# Patient Record
Sex: FEMALE | Race: BLACK OR AFRICAN AMERICAN | NOT HISPANIC OR LATINO | Employment: FULL TIME | URBAN - METROPOLITAN AREA
[De-identification: names, ages, dates, MRNs, and addresses within clinical notes are randomized per-mention and may not be internally consistent; named-entity substitution may affect disease eponyms.]

---

## 2018-05-19 ENCOUNTER — HOSPITAL ENCOUNTER (EMERGENCY)
Facility: HOSPITAL | Age: 33
Discharge: HOME | End: 2018-05-19
Payer: COMMERCIAL

## 2018-05-19 ENCOUNTER — HOSPITAL ENCOUNTER (EMERGENCY)
Facility: HOSPITAL | Age: 33
Discharge: HOME | End: 2018-05-20
Attending: EMERGENCY MEDICINE
Payer: COMMERCIAL

## 2018-05-19 VITALS
TEMPERATURE: 99 F | HEIGHT: 65 IN | OXYGEN SATURATION: 98 % | WEIGHT: 178 LBS | HEART RATE: 88 BPM | DIASTOLIC BLOOD PRESSURE: 77 MMHG | BODY MASS INDEX: 29.66 KG/M2 | RESPIRATION RATE: 20 BRPM | SYSTOLIC BLOOD PRESSURE: 119 MMHG

## 2018-05-19 DIAGNOSIS — R10.9 ABDOMINAL PRESSURE: Primary | ICD-10-CM

## 2018-05-19 DIAGNOSIS — R10.84 GENERALIZED ABDOMINAL PAIN: Primary | ICD-10-CM

## 2018-05-19 DIAGNOSIS — N76.0 BACTERIAL VAGINOSIS: ICD-10-CM

## 2018-05-19 DIAGNOSIS — B96.89 BACTERIAL VAGINOSIS: ICD-10-CM

## 2018-05-19 LAB
ALBUMIN SERPL BCP-MCNC: 3.3 G/DL
ALP SERPL-CCNC: 68 U/L
ALT SERPL W/O P-5'-P-CCNC: 18 U/L
ANION GAP SERPL CALC-SCNC: 9 MMOL/L
AST SERPL-CCNC: 16 U/L
B-HCG UR QL: NEGATIVE
B-HCG UR QL: NEGATIVE
BACTERIA GENITAL QL WET PREP: ABNORMAL
BASOPHILS # BLD AUTO: 0.02 K/UL
BASOPHILS NFR BLD: 0.3 %
BILIRUB SERPL-MCNC: 0.5 MG/DL
BILIRUB UR QL STRIP: NEGATIVE
BUN SERPL-MCNC: 14 MG/DL
CALCIUM SERPL-MCNC: 9.2 MG/DL
CHLORIDE SERPL-SCNC: 104 MMOL/L
CLARITY UR: CLEAR
CLUE CELLS VAG QL WET PREP: ABNORMAL
CO2 SERPL-SCNC: 27 MMOL/L
COLOR UR: YELLOW
CREAT SERPL-MCNC: 1 MG/DL
DIFFERENTIAL METHOD: ABNORMAL
EOSINOPHIL # BLD AUTO: 0 K/UL
EOSINOPHIL NFR BLD: 0.4 %
ERYTHROCYTE [DISTWIDTH] IN BLOOD BY AUTOMATED COUNT: 13.9 %
EST. GFR  (AFRICAN AMERICAN): >60 ML/MIN/1.73 M^2
EST. GFR  (NON AFRICAN AMERICAN): >60 ML/MIN/1.73 M^2
FILAMENT FUNGI VAG WET PREP-#/AREA: ABNORMAL
GLUCOSE SERPL-MCNC: 95 MG/DL
GLUCOSE UR QL STRIP: NEGATIVE
HCT VFR BLD AUTO: 35.2 %
HGB BLD-MCNC: 11.8 G/DL
HGB UR QL STRIP: NEGATIVE
KETONES UR QL STRIP: NEGATIVE
LEUKOCYTE ESTERASE UR QL STRIP: NEGATIVE
LIPASE SERPL-CCNC: 22 U/L
LYMPHOCYTES # BLD AUTO: 1.8 K/UL
LYMPHOCYTES NFR BLD: 23.2 %
MCH RBC QN AUTO: 27.4 PG
MCHC RBC AUTO-ENTMCNC: 33.5 G/DL
MCV RBC AUTO: 82 FL
MONOCYTES # BLD AUTO: 0.8 K/UL
MONOCYTES NFR BLD: 9.8 %
NEUTROPHILS # BLD AUTO: 5.2 K/UL
NEUTROPHILS NFR BLD: 66.3 %
NITRITE UR QL STRIP: NEGATIVE
PH UR STRIP: 7 [PH] (ref 5–8)
PLATELET # BLD AUTO: 240 K/UL
PMV BLD AUTO: 10 FL
POTASSIUM SERPL-SCNC: 3.6 MMOL/L
PROT SERPL-MCNC: 7.7 G/DL
PROT UR QL STRIP: NEGATIVE
RBC # BLD AUTO: 4.31 M/UL
SODIUM SERPL-SCNC: 140 MMOL/L
SP GR UR STRIP: 1.01 (ref 1–1.03)
SPECIMEN SOURCE: ABNORMAL
T VAGINALIS GENITAL QL WET PREP: ABNORMAL
URN SPEC COLLECT METH UR: NORMAL
UROBILINOGEN UR STRIP-ACNC: NEGATIVE EU/DL
WBC # BLD AUTO: 7.86 K/UL
WBC #/AREA VAG WET PREP: ABNORMAL
YEAST GENITAL QL WET PREP: ABNORMAL

## 2018-05-19 PROCEDURE — 99283 EMERGENCY DEPT VISIT LOW MDM: CPT | Mod: 27

## 2018-05-19 PROCEDURE — 85025 COMPLETE CBC W/AUTO DIFF WBC: CPT

## 2018-05-19 PROCEDURE — 81003 URINALYSIS AUTO W/O SCOPE: CPT | Mod: 91

## 2018-05-19 PROCEDURE — 81025 URINE PREGNANCY TEST: CPT | Mod: 91

## 2018-05-19 PROCEDURE — 83690 ASSAY OF LIPASE: CPT

## 2018-05-19 PROCEDURE — 81003 URINALYSIS AUTO W/O SCOPE: CPT

## 2018-05-19 PROCEDURE — 81025 URINE PREGNANCY TEST: CPT

## 2018-05-19 PROCEDURE — 87491 CHLMYD TRACH DNA AMP PROBE: CPT

## 2018-05-19 PROCEDURE — 87210 SMEAR WET MOUNT SALINE/INK: CPT

## 2018-05-19 PROCEDURE — 80053 COMPREHEN METABOLIC PANEL: CPT

## 2018-05-19 PROCEDURE — 99283 EMERGENCY DEPT VISIT LOW MDM: CPT

## 2018-05-19 RX ORDER — HYDROCHLOROTHIAZIDE 25 MG/1
25 TABLET ORAL DAILY
COMMUNITY
End: 2023-03-10 | Stop reason: SDUPTHER

## 2018-05-19 RX ORDER — METRONIDAZOLE 500 MG/1
500 TABLET ORAL EVERY 12 HOURS
Qty: 14 TABLET | Refills: 0 | Status: SHIPPED | OUTPATIENT
Start: 2018-05-19 | End: 2018-05-26

## 2018-05-19 RX ORDER — LEVONORGESTREL AND ETHINYL ESTRADIOL 0.1-0.02MG
1 KIT ORAL DAILY
COMMUNITY
End: 2021-01-06 | Stop reason: CLARIF

## 2018-05-19 NOTE — DISCHARGE INSTRUCTIONS
[facility]  Re-evaluation with new/worsening complaints  Urinalysis normal  Exam normal  No emergent condition detected at this time  Follow up with a PCP - provided clinic information.

## 2018-05-19 NOTE — ED PROVIDER NOTES
"SCRIBE #1 NOTE: I, Terence Cronin, am scribing for, and in the presence of, SALOME Mayberry. I have scribed the entire note.       History      Chief Complaint   Patient presents with    Urinary Urgency     pt c/o urinary urgency and bladder pain       Review of patient's allergies indicates:   Allergen Reactions    Bactrim [sulfamethoxazole-trimethoprim] Swelling        HPI   HPI    5/19/2018, 12:43 AM   History obtained from the patient      History of Present Illness: Ruma Keane is a 32 y.o. female patient who presents to the Emergency Department for increased urinary urgency which onset gradually 1 days ago. Symptoms are constant and moderate in severity.  No mitigating or exacerbating factors reported. Associated sxs include lower abd "pressure." Pt reports that [facility], had a pelvic exam and labs drawn, and is currently waiting on the results. Patient denies any dysuria, hematuria, n/v/d, changes in urinary frequency, vaginal pain/ discharge, and all other sxs at this time. No further complaints or concerns at this time.       Arrival mode: Personal vehicle    PCP: No primary care provider on file.       Past Medical History:  History reviewed. No pertinent past medical history.    Past Surgical History:  No past surgical history on file.      Family History:  History reviewed. No pertinent family history.    Social History:  Social History     Social History Main Topics    Smoking status: Not on file    Smokeless tobacco: Not on file    Alcohol use Not on file    Drug use: Unknown    Sexual activity: Not on file       ROS   Review of Systems   Constitutional: Negative for chills and fever.   Respiratory: Negative for shortness of breath.    Cardiovascular: Negative for chest pain.   Gastrointestinal: Negative for blood in stool, constipation, diarrhea, nausea and vomiting.        + abdominal "pressure"   Genitourinary: Positive for urgency. " "Negative for difficulty urinating, dysuria, frequency, hematuria, vaginal bleeding and vaginal pain.   Neurological: Negative for dizziness, weakness, light-headedness, numbness and headaches.   All other systems reviewed and are negative.      Physical Exam      Initial Vitals [05/19/18 0039]   BP Pulse Resp Temp SpO2   119/77 88 20 98.7 °F (37.1 °C) 98 %      MAP       91          Physical Exam  Nursing Notes and Vital Signs Reviewed.  Constitutional: Patient is in no apparent distress. Well-developed and well-nourished.  Head: Atraumatic. Normocephalic.  Eyes: Conjunctivae are not pale.   Neck: Supple. Full ROM. No lymphadenopathy.  Cardiovascular: Regular rate. Regular rhythm. No murmurs, rubs, or gallops. Well perfused  Pulmonary/Chest: No respiratory distress. Clear to auscultation bilaterally. No wheezing or rales.  Abdominal: Soft and non-distended.  There is no tenderness.  No rebound, guarding, or rigidity. Good bowel sound.  Musculoskeletal: Moves all extremities. No obvious deformities. Skin: Warm and dry.  Neurological:  Alert, awake, and appropriate.  Normal speech.  No acute focal neurological deficits are appreciated.  Psychiatric: Normal affect. Good eye contact. Appropriate in content.    ED Course    Procedures  ED Vital Signs:  Vitals:    05/19/18 0039   BP: 119/77   Pulse: 88   Resp: 20   Temp: 98.7 °F (37.1 °C)   TempSrc: Oral   SpO2: 98%   Weight: 80.7 kg (178 lb 0.3 oz)   Height: 5' 5" (1.651 m)       Abnormal Lab Results:  Labs Reviewed   URINALYSIS   PREGNANCY TEST, URINE RAPID   URINALYSIS        All Lab Results:  Results for orders placed or performed during the hospital encounter of 05/19/18   Urinalysis   Result Value Ref Range    Specimen UA Urine, Clean Catch     Color, UA Yellow Yellow, Straw, Iva    Appearance, UA Clear Clear    pH, UA 7.0 5.0 - 8.0    Specific Gravity, UA 1.015 1.005 - 1.030    Protein, UA Negative Negative    Glucose, UA Negative Negative    Ketones, UA Negative " Negative    Bilirubin (UA) Negative Negative    Occult Blood UA Negative Negative    Nitrite, UA Negative Negative    Urobilinogen, UA Negative <2.0 EU/dL    Leukocytes, UA Negative Negative   Pregnancy, urine rapid (UPT)   Result Value Ref Range    Preg Test, Ur Negative    Urinalysis Clean Catch   Result Value Ref Range    Specimen UA Urine, Clean Catch     Color, UA Yellow Yellow, Straw, Iva    Appearance, UA Clear Clear    pH, UA 7.0 5.0 - 8.0    Specific Gravity, UA 1.015 1.005 - 1.030    Protein, UA Negative Negative    Glucose, UA Negative Negative    Ketones, UA Negative Negative    Bilirubin (UA) Negative Negative    Occult Blood UA Negative Negative    Nitrite, UA Negative Negative    Urobilinogen, UA Negative <2.0 EU/dL    Leukocytes, UA Negative Negative                  The Emergency Provider reviewed the vital signs and test results, which are outlined above.    ED Discussion     1:19 AM: Reassessed pt at this time. Discussed with pt all pertinent ED information and results. Discussed pt dx and plan of tx. Gave pt all f/u and return to the ED instructions. All questions and concerns were addressed at this time. Pt expresses understanding of information and instructions, and is comfortable with plan to discharge. Pt is stable for discharge.    I discussed with patient and/or family/caretaker that evaluation in the ED does not suggest any emergent or life threatening medical conditions requiring immediate intervention beyond what was provided in the ED, and I believe patient is safe for discharge.  Regardless, an unremarkable evaluation in the ED does not preclude the development or presence of a serious of life threatening condition. As such, patient was instructed to return immediately for any worsening or change in current symptoms.      ED Medication(s):  Medications - No data to display    New Prescriptions    No medications on file             Medical Decision Making    Medical Decision Making:    Clinical Tests:   Lab Tests: Reviewed and Ordered           Scribe Attestation:   Scribe #1: I performed the above scribed service and the documentation accurately describes the services I performed. I attest to the accuracy of the note.    Attending:   Physician Attestation Statement for Scribe #1: I, SALOME Mayberry, personally performed the services described in this documentation, as scribed by Terence Cronin, in my presence, and it is both accurate and complete.          Clinical Impression       ICD-10-CM ICD-9-CM   1. Abdominal pressure R10.9 789.00       Disposition:   Disposition: Discharged  Condition: Stable    Discussed findings with patient. States she was mostly concerned about an std because a condom broke 4 days ago. [facility] Patient states all results came back normal but she is still waiting on 1 test which she will get results for on Monday.  Not currently having pain.  Exam normal. UA normal. Advised patient await results Monday. Re-evaluation with acutely new/worsening complaints.      SALOME Mayberry-C  05/19/18 0146

## 2018-05-20 VITALS
TEMPERATURE: 98 F | HEART RATE: 72 BPM | WEIGHT: 179.69 LBS | BODY MASS INDEX: 29.94 KG/M2 | DIASTOLIC BLOOD PRESSURE: 72 MMHG | RESPIRATION RATE: 18 BRPM | HEIGHT: 65 IN | SYSTOLIC BLOOD PRESSURE: 112 MMHG | OXYGEN SATURATION: 100 %

## 2018-05-20 NOTE — ED PROVIDER NOTES
SCRIBE #1 NOTE: I, Hallie Marshall, am scribing for, and in the presence of, Tree Wyman DO. I have scribed the entire note.      History      Chief Complaint   Patient presents with    Abdominal Pain     reports she was seen here this morning for lower abd pain and now pain is higher in umbilical region; urinary frequency, urgency, dysuria       Review of patient's allergies indicates:   Allergen Reactions    Bactrim [sulfamethoxazole-trimethoprim] Swelling        HPI   HPI    5/19/2018, 9:43 PM   History obtained from the patient      History of Present Illness: Ruma Keane is a 32 y.o. female patient who presents to the Emergency Department for generalized abd pain which onset gradually 3 days ago. Pt was seen here last night for urinary urgency and lower abd pain and had a urinalysis performed with negative results. Symptoms are constant and moderate in severity.  No mitigating or exacerbating factors reported. Pt is also complaining of vaginal discomfort. Pt is concerned that she might have a STI and would like to be tested. No other sxs reported. Patient denies any fever, chills, constipation, hematochezia, dysuria, hematuria, urinary frequency, n/v/d, vaginal discharge/bleeding, and all other sxs at this time. No further complaints or concerns at this time.         Arrival mode: Personal vehicle      PCP: No primary care provider on file.       Past Medical History:  Past Medical History:   Diagnosis Date    Hypertension        Past Surgical History:  Past Surgical History:   Procedure Laterality Date    TONSILLECTOMY           Family History:  History reviewed. No pertinent family history.    Social History:  Social History     Social History Main Topics    Smoking status: Never Smoker    Smokeless tobacco: Never Used    Alcohol use No    Drug use: Unknown    Sexual activity: Unknown       ROS   Review of Systems   Constitutional: Negative for chills and fever.   HENT: Negative for sore  throat.    Respiratory: Negative for shortness of breath.    Cardiovascular: Negative for chest pain.   Gastrointestinal: Positive for abdominal pain. Negative for blood in stool, constipation, diarrhea, nausea and vomiting.   Genitourinary: Negative for dysuria, frequency, hematuria, vaginal bleeding and vaginal discharge.        (+) vaginal discomfort   Musculoskeletal: Negative for back pain.   Skin: Negative for rash.   Neurological: Negative for weakness.   Hematological: Does not bruise/bleed easily.   All other systems reviewed and are negative.      Physical Exam      Initial Vitals [05/19/18 2117]   BP Pulse Resp Temp SpO2   (!) 143/81 88 18 98.8 °F (37.1 °C) 99 %      MAP       101.67          Physical Exam  Nursing Notes and Vital Signs Reviewed.  Constitutional: Patient is in no acute distress. Well-developed and well-nourished.  Head: Atraumatic. Normocephalic.  Eyes: PERRL. EOM intact. Conjunctivae are not pale. No scleral icterus.  ENT: Mucous membranes are moist. Oropharynx is clear and symmetric.    Neck: Supple. Full ROM. No lymphadenopathy.  Cardiovascular: Regular rate. Regular rhythm. No murmurs, rubs, or gallops. Distal pulses are 2+ and symmetric.  Pulmonary/Chest: No respiratory distress. Clear to auscultation bilaterally. No wheezing or rales.  Abdominal: Soft and non-distended.  There is no tenderness.  No rebound, guarding, or rigidity.  Pelvic: A female chaperone was present for this examination. Nl external inspection. No lesions or abnormalities were visible on the labia majora or minora. Cervical os is closed. There is no CMT. There is no blood in the vaginal vault. Mild white discharge. No adnexal tenderness. No adnexal masses.  Musculoskeletal: Moves all extremities. No obvious deformities. No edema.   Skin: Warm and dry.  Neurological:  Alert, awake, and appropriate.  Normal speech.  No acute focal neurological deficits are appreciated.  Psychiatric: Normal affect. Good eye contact.  "Appropriate in content.    ED Course    Procedures  ED Vital Signs:  Vitals:    05/19/18 2117 05/19/18 2207   BP: (!) 143/81 117/74   Pulse: 88 73   Resp: 18    Temp: 98.8 °F (37.1 °C)    TempSrc: Oral    SpO2: 99% 99%   Weight: 81.5 kg (179 lb 10.8 oz)    Height: 5' 5" (1.651 m)        Abnormal Lab Results:  Labs Reviewed   CBC W/ AUTO DIFFERENTIAL - Abnormal; Notable for the following:        Result Value    Hemoglobin 11.8 (*)     Hematocrit 35.2 (*)     All other components within normal limits   COMPREHENSIVE METABOLIC PANEL - Abnormal; Notable for the following:     Albumin 3.3 (*)     All other components within normal limits   VAGINAL SCREEN - Abnormal; Notable for the following:     Clue Cells, Wet Prep Occasional (*)     WBC - Vaginal Screen Few (*)     Bacteria - Vaginal Screen Few (*)     All other components within normal limits   C. TRACHOMATIS/N. GONORRHOEAE BY AMP DNA   LIPASE   URINALYSIS   PREGNANCY TEST, URINE RAPID        All Lab Results:  Results for orders placed or performed during the hospital encounter of 05/19/18   CBC auto differential   Result Value Ref Range    WBC 7.86 3.90 - 12.70 K/uL    RBC 4.31 4.00 - 5.40 M/uL    Hemoglobin 11.8 (L) 12.0 - 16.0 g/dL    Hematocrit 35.2 (L) 37.0 - 48.5 %    MCV 82 82 - 98 fL    MCH 27.4 27.0 - 31.0 pg    MCHC 33.5 32.0 - 36.0 g/dL    RDW 13.9 11.5 - 14.5 %    Platelets 240 150 - 350 K/uL    MPV 10.0 9.2 - 12.9 fL    Gran # (ANC) 5.2 1.8 - 7.7 K/uL    Lymph # 1.8 1.0 - 4.8 K/uL    Mono # 0.8 0.3 - 1.0 K/uL    Eos # 0.0 0.0 - 0.5 K/uL    Baso # 0.02 0.00 - 0.20 K/uL    Gran% 66.3 38.0 - 73.0 %    Lymph% 23.2 18.0 - 48.0 %    Mono% 9.8 4.0 - 15.0 %    Eosinophil% 0.4 0.0 - 8.0 %    Basophil% 0.3 0.0 - 1.9 %    Differential Method Automated    Comprehensive metabolic panel   Result Value Ref Range    Sodium 140 136 - 145 mmol/L    Potassium 3.6 3.5 - 5.1 mmol/L    Chloride 104 95 - 110 mmol/L    CO2 27 23 - 29 mmol/L    Glucose 95 70 - 110 mg/dL    BUN, " Bld 14 6 - 20 mg/dL    Creatinine 1.0 0.5 - 1.4 mg/dL    Calcium 9.2 8.7 - 10.5 mg/dL    Total Protein 7.7 6.0 - 8.4 g/dL    Albumin 3.3 (L) 3.5 - 5.2 g/dL    Total Bilirubin 0.5 0.1 - 1.0 mg/dL    Alkaline Phosphatase 68 55 - 135 U/L    AST 16 10 - 40 U/L    ALT 18 10 - 44 U/L    Anion Gap 9 8 - 16 mmol/L    eGFR if African American >60 >60 mL/min/1.73 m^2    eGFR if non African American >60 >60 mL/min/1.73 m^2   Lipase   Result Value Ref Range    Lipase 22 4 - 60 U/L   Urinalysis   Result Value Ref Range    Specimen UA Urine, Clean Catch     Color, UA Yellow Yellow, Straw, Iva    Appearance, UA Clear Clear    pH, UA 7.0 5.0 - 8.0    Specific Gravity, UA 1.015 1.005 - 1.030    Protein, UA Negative Negative    Glucose, UA Negative Negative    Ketones, UA Negative Negative    Bilirubin (UA) Negative Negative    Occult Blood UA Negative Negative    Nitrite, UA Negative Negative    Urobilinogen, UA Negative <2.0 EU/dL    Leukocytes, UA Negative Negative   Pregnancy, urine rapid   Result Value Ref Range    Preg Test, Ur Negative    Vaginal Screen Vagina   Result Value Ref Range    Trichomonas None None    Clue Cells, Wet Prep Occasional (A) None    Budding Yeast None None    Fungal Hyphae None None    WBC - Vaginal Screen Few (A) None    Bacteria - Vaginal Screen Few (A) None    Wet Prep Source Vagina None              The Emergency Provider reviewed the vital signs and test results, which are outlined above.    ED Discussion     11:59 PM: Reassessed pt at this time.  Pt is resting comfortably and is in NAD at this time.Discussed with patient, would like to start treatment for BV and await the send out gc chlamydia for further.  Pt would like to be discharged. Discussed with pt all pertinent ED information and results. Discussed pt dx and plan of tx. Gave pt all f/u and return to the ED instructions. All questions and concerns were addressed at this time. Pt expresses understanding of information and instructions, and  is comfortable with plan to discharge. Pt is stable for discharge.    I discussed with patient and/or family/caretaker that evaluation in the ED does not suggest any emergent or life threatening medical conditions requiring immediate intervention beyond what was provided in the ED, and I believe patient is safe for discharge.  Regardless, an unremarkable evaluation in the ED does not preclude the development or presence of a serious of life threatening condition. As such, patient was instructed to return immediately for any worsening or change in current symptoms.        ED Medication(s):  Medications - No data to display    New Prescriptions    METRONIDAZOLE (FLAGYL) 500 MG TABLET    Take 1 tablet (500 mg total) by mouth every 12 (twelve) hours.       Follow-up Information     pcp.    Contact information:  Follow up with your pcp for recheck and further evaluation in 2-3 days, return sooner if problem                   Medical Decision Making    Medical Decision Making:   Clinical Tests:   Lab Tests: Ordered and Reviewed           Scribe Attestation:   Scribe #1: I performed the above scribed service and the documentation accurately describes the services I performed. I attest to the accuracy of the note.    Attending:   Physician Attestation Statement for Scribe #1: I, Tree Wyman DO, personally performed the services described in this documentation, as scribed by Hallie Marshall, in my presence, and it is both accurate and complete.          Clinical Impression       ICD-10-CM ICD-9-CM   1. Generalized abdominal pain R10.84 789.07   2. Bacterial vaginosis N76.0 616.10    B96.89 041.9       Disposition:   Disposition: Discharged  Condition: Stable         Tree yWman DO  05/20/18 0002

## 2018-05-21 ENCOUNTER — PES CALL (OUTPATIENT)
Dept: ADMINISTRATIVE | Facility: CLINIC | Age: 33
End: 2018-05-21

## 2018-05-21 LAB
C TRACH DNA SPEC QL NAA+PROBE: NOT DETECTED
N GONORRHOEA DNA SPEC QL NAA+PROBE: NOT DETECTED

## 2019-11-21 ENCOUNTER — HOSPITAL ENCOUNTER (EMERGENCY)
Facility: HOSPITAL | Age: 34
Discharge: HOME | End: 2019-11-22
Attending: EMERGENCY MEDICINE
Payer: COMMERCIAL

## 2019-11-21 DIAGNOSIS — R07.9 CHEST PAIN, UNSPECIFIED TYPE: Primary | ICD-10-CM

## 2019-11-21 DIAGNOSIS — R07.9 CHEST PAIN: ICD-10-CM

## 2019-11-21 LAB
INFLUENZA A, MOLECULAR: NEGATIVE
INFLUENZA B, MOLECULAR: NEGATIVE
SPECIMEN SOURCE: NORMAL

## 2019-11-21 PROCEDURE — 93005 ELECTROCARDIOGRAM TRACING: CPT

## 2019-11-21 PROCEDURE — 85610 PROTHROMBIN TIME: CPT

## 2019-11-21 PROCEDURE — 85025 COMPLETE CBC W/AUTO DIFF WBC: CPT

## 2019-11-21 PROCEDURE — 85379 FIBRIN DEGRADATION QUANT: CPT

## 2019-11-21 PROCEDURE — 93010 ELECTROCARDIOGRAM REPORT: CPT | Mod: ,,, | Performed by: INTERNAL MEDICINE

## 2019-11-21 PROCEDURE — 87086 URINE CULTURE/COLONY COUNT: CPT

## 2019-11-21 PROCEDURE — 81025 URINE PREGNANCY TEST: CPT

## 2019-11-21 PROCEDURE — 87502 INFLUENZA DNA AMP PROBE: CPT

## 2019-11-21 PROCEDURE — 84484 ASSAY OF TROPONIN QUANT: CPT

## 2019-11-21 PROCEDURE — 99285 EMERGENCY DEPT VISIT HI MDM: CPT | Mod: 25

## 2019-11-21 PROCEDURE — 93010 EKG 12-LEAD: ICD-10-PCS | Mod: ,,, | Performed by: INTERNAL MEDICINE

## 2019-11-21 PROCEDURE — 81000 URINALYSIS NONAUTO W/SCOPE: CPT

## 2019-11-21 PROCEDURE — 36415 COLL VENOUS BLD VENIPUNCTURE: CPT

## 2019-11-22 VITALS
TEMPERATURE: 99 F | WEIGHT: 173.94 LBS | RESPIRATION RATE: 18 BRPM | HEART RATE: 70 BPM | BODY MASS INDEX: 28.98 KG/M2 | SYSTOLIC BLOOD PRESSURE: 106 MMHG | OXYGEN SATURATION: 99 % | HEIGHT: 65 IN | DIASTOLIC BLOOD PRESSURE: 62 MMHG

## 2019-11-22 LAB
B-HCG UR QL: NEGATIVE
BACTERIA #/AREA URNS HPF: ABNORMAL /HPF
BASOPHILS # BLD AUTO: 0.04 K/UL (ref 0–0.2)
BASOPHILS NFR BLD: 0.5 % (ref 0–1.9)
BILIRUB UR QL STRIP: NEGATIVE
CLARITY UR: CLEAR
COLOR UR: YELLOW
D DIMER PPP IA.FEU-MCNC: 0.51 MG/L FEU
DACRYOCYTES BLD QL SMEAR: ABNORMAL
DIFFERENTIAL METHOD: ABNORMAL
EOSINOPHIL # BLD AUTO: 0 K/UL (ref 0–0.5)
EOSINOPHIL NFR BLD: 0.4 % (ref 0–8)
ERYTHROCYTE [DISTWIDTH] IN BLOOD BY AUTOMATED COUNT: 13.4 % (ref 11.5–14.5)
GLUCOSE UR QL STRIP: NEGATIVE
HCT VFR BLD AUTO: 36.4 % (ref 37–48.5)
HGB BLD-MCNC: 11.6 G/DL (ref 12–16)
HGB UR QL STRIP: ABNORMAL
IMM GRANULOCYTES # BLD AUTO: 0.04 K/UL (ref 0–0.04)
IMM GRANULOCYTES NFR BLD AUTO: 0.5 % (ref 0–0.5)
INR PPP: 1.1 (ref 0.8–1.2)
KETONES UR QL STRIP: NEGATIVE
LEUKOCYTE ESTERASE UR QL STRIP: ABNORMAL
LYMPHOCYTES # BLD AUTO: 1.6 K/UL (ref 1–4.8)
LYMPHOCYTES NFR BLD: 18.9 % (ref 18–48)
MCH RBC QN AUTO: 27.6 PG (ref 27–31)
MCHC RBC AUTO-ENTMCNC: 31.9 G/DL (ref 32–36)
MCV RBC AUTO: 87 FL (ref 82–98)
MICROSCOPIC COMMENT: ABNORMAL
MONOCYTES # BLD AUTO: 1.1 K/UL (ref 0.3–1)
MONOCYTES NFR BLD: 12.9 % (ref 4–15)
NEUTROPHILS # BLD AUTO: 5.7 K/UL (ref 1.8–7.7)
NEUTROPHILS NFR BLD: 66.8 % (ref 38–73)
NITRITE UR QL STRIP: NEGATIVE
NRBC BLD-RTO: 0 /100 WBC
PH UR STRIP: 7 [PH] (ref 5–8)
PLATELET # BLD AUTO: 214 K/UL (ref 150–350)
PLATELET BLD QL SMEAR: ABNORMAL
PMV BLD AUTO: 10.9 FL (ref 9.2–12.9)
POIKILOCYTOSIS BLD QL SMEAR: SLIGHT
PROT UR QL STRIP: NEGATIVE
PROTHROMBIN TIME: 11.8 SEC (ref 9–12.5)
RBC # BLD AUTO: 4.21 M/UL (ref 4–5.4)
RBC #/AREA URNS HPF: 0 /HPF (ref 0–4)
SP GR UR STRIP: 1.01 (ref 1–1.03)
SQUAMOUS #/AREA URNS HPF: 10 /HPF
TROPONIN I SERPL DL<=0.01 NG/ML-MCNC: <0.006 NG/ML (ref 0–0.03)
URN SPEC COLLECT METH UR: ABNORMAL
UROBILINOGEN UR STRIP-ACNC: NEGATIVE EU/DL
WBC # BLD AUTO: 8.45 K/UL (ref 3.9–12.7)
WBC #/AREA URNS HPF: 15 /HPF (ref 0–5)

## 2019-11-22 PROCEDURE — 25500020 PHARM REV CODE 255: Performed by: EMERGENCY MEDICINE

## 2019-11-22 RX ADMIN — IOHEXOL 100 ML: 350 INJECTION, SOLUTION INTRAVENOUS at 01:11

## 2019-11-22 NOTE — ED PROVIDER NOTES
34 year old female presents to the ER with mid sternal chest pain that shoots to her back.      Pt understands that a workup will begin in the treatment lounge/results waiting areas due to there being no available beds. Pt also undertstands they will be placed in the next available bed where they will be seen and dispositioned by a physician. I am removing myself from the care of pt. Pt will be assigned to next available physician.        Chong Beauchamp [facility] [address]      SCRIBE #1 NOTE: I, Luca Patel, am scribing for, and in the presence of, Hattie Mcclellan MD. I have scribed the entire note.       History     Chief Complaint   Patient presents with    Chest Pain     Pt reports midsternal CP that radiates to back and SOB that started today; pt states she was dx with the flu 11/2     Review of patient's allergies indicates:   Allergen Reactions    Bactrim [sulfamethoxazole-trimethoprim] Swelling         History of Present Illness     HPI    11/21/2019, 11:20 PM  History obtained from the patient      History of Present Illness: Ruma Keane is a 34 y.o. female patient with a h/o HTN who presents to the Emergency Department for evaluation of sharp mid sternal CP which onset gradually 4 days ago. Symptoms are constant and moderate in severity. No mitigating or exacerbating factors reported. Associated sxs includes SOB, HA and fatigue. Patient denies any SOB, cough, leg swelling, diaphoresis, n/v, dizziness, weakness, and all other sxs at this time. No prior Tx reported. No further complaints or concerns at this time. Pt also reports subjective increased warmth to the hands and feet onset several months ago. Pt denies any recent travel and states that it feels as though something is stuck in her chest.      Arrival mode: Personal transportation    PCP: Alcon Maher MD        Past Medical History:  Past Medical History:   Diagnosis Date    Hypertension        Past Surgical History:  Past Surgical History:    Procedure Laterality Date    TONSILLECTOMY           Family History:  History reviewed. No pertinent family history.       Social History:  Social History     Tobacco Use    Smoking status: Never Smoker    Smokeless tobacco: Never Used   Substance and Sexual Activity    Alcohol use: No    Drug use: unknown    Sexual activity: unknown        Review of Systems     Review of Systems   Constitutional: Positive for fatigue. Negative for diaphoresis and fever.   HENT: Negative for sore throat.    Respiratory: Positive for shortness of breath. Negative for cough.    Cardiovascular: Positive for chest pain. Negative for leg swelling.   Gastrointestinal: Negative for nausea and vomiting.   Genitourinary: Negative for dysuria.   Musculoskeletal: Negative for back pain.   Skin: Negative for rash.   Neurological: Positive for headaches. Negative for dizziness and weakness.   Hematological: Does not bruise/bleed easily.   All other systems reviewed and are negative.       Physical Exam     Initial Vitals [11/21/19 2228]   BP Pulse Resp Temp SpO2   (!) 143/82 96 18 98.7 °F (37.1 °C) 99 %      MAP       --          Physical Exam  Nursing Notes and Vital Signs Reviewed.  Constitutional: Well-developed and well-nourished. NAD.  Head: Atraumatic. Normocephalic.  Eyes: PERRL. EOM intact. Conjunctivae are not pale. No scleral icterus.  ENT: Mucous membranes are moist. Oropharynx is clear and symmetric.    Neck: Supple. Full ROM. No lymphadenopathy.  Cardiovascular: Regular rate. Regular rhythm. No murmurs, rubs, or gallops. Distal pulses are 2+ and symmetric.  Pulmonary/Chest: No respiratory distress. Clear to auscultation bilaterally. No wheezing or rales.  Abdominal: Soft and non-distended.  There is no tenderness.  No rebound, guarding, or rigidity. Good bowel sounds.  Genitourinary: No CVA tenderness  Musculoskeletal: Moves all extremities. No obvious deformities. No calf tenderness.  Skin: Warm and dry.  Neurological:   "Alert, awake, and appropriate.  Normal speech.  No acute focal neurological deficits are appreciated.  Psychiatric: Normal affect. Good eye contact. Appropriate in content.     ED Course   Procedures  ED Vital Signs:  Vitals:    11/21/19 2228 11/21/19 2229 11/22/19 0030 11/22/19 0100   BP: (!) 143/82 129/85 115/89 116/75   Pulse: 96  82 75   Resp: 18  20 17   Temp: 98.7 °F (37.1 °C)      TempSrc: Oral      SpO2: 99%  100% 100%   Weight: 78.9 kg (173 lb 15.1 oz)      Height: 5' 5" (1.651 m)       11/22/19 0105 11/22/19 0130 11/22/19 0200 11/22/19 0300   BP:  111/68 121/69 106/62   Pulse: 80 75 74 70   Resp:  18 18 18   Temp:       TempSrc:       SpO2:  100% 98% 99%   Weight:       Height:           Abnormal Lab Results:  Labs Reviewed   CBC W/ AUTO DIFFERENTIAL - Abnormal; Notable for the following components:       Result Value    Hemoglobin 11.6 (*)     Hematocrit 36.4 (*)     Mean Corpuscular Hemoglobin Conc 31.9 (*)     Mono # 1.1 (*)     All other components within normal limits   URINALYSIS, REFLEX TO URINE CULTURE - Abnormal; Notable for the following components:    Occult Blood UA Trace (*)     Leukocytes, UA 3+ (*)     All other components within normal limits    Narrative:     Preferred Collection Type->Urine, Clean Catch   D DIMER, QUANTITATIVE - Abnormal; Notable for the following components:    D-Dimer 0.51 (*)     All other components within normal limits   URINALYSIS MICROSCOPIC - Abnormal; Notable for the following components:    WBC, UA 15 (*)     All other components within normal limits    Narrative:     Preferred Collection Type->Urine, Clean Catch   INFLUENZA A & B BY MOLECULAR   CULTURE, URINE   PREGNANCY TEST, URINE RAPID   PROTIME-INR   TROPONIN I        All Lab Results:  Results for orders placed or performed during the hospital encounter of 11/21/19   Influenza A & B by Molecular   Result Value Ref Range    Influenza A, Molecular Negative Negative    Influenza B, Molecular Negative Negative "    Flu A & B Source Nasal swab    CBC auto differential   Result Value Ref Range    WBC 8.45 3.90 - 12.70 K/uL    RBC 4.21 4.00 - 5.40 M/uL    Hemoglobin 11.6 (L) 12.0 - 16.0 g/dL    Hematocrit 36.4 (L) 37.0 - 48.5 %    Mean Corpuscular Volume 87 82 - 98 fL    Mean Corpuscular Hemoglobin 27.6 27.0 - 31.0 pg    Mean Corpuscular Hemoglobin Conc 31.9 (L) 32.0 - 36.0 g/dL    RDW 13.4 11.5 - 14.5 %    Platelets 214 150 - 350 K/uL    MPV 10.9 9.2 - 12.9 fL    Immature Granulocytes 0.5 0.0 - 0.5 %    Gran # (ANC) 5.7 1.8 - 7.7 K/uL    Immature Grans (Abs) 0.04 0.00 - 0.04 K/uL    Lymph # 1.6 1.0 - 4.8 K/uL    Mono # 1.1 (H) 0.3 - 1.0 K/uL    Eos # 0.0 0.0 - 0.5 K/uL    Baso # 0.04 0.00 - 0.20 K/uL    nRBC 0 0 /100 WBC    Gran% 66.8 38.0 - 73.0 %    Lymph% 18.9 18.0 - 48.0 %    Mono% 12.9 4.0 - 15.0 %    Eosinophil% 0.4 0.0 - 8.0 %    Basophil% 0.5 0.0 - 1.9 %    Platelet Estimate Appears normal     Poik Slight     Tear Drop Cells Occasional     Differential Method Automated    Urinalysis, Reflex to Urine Culture Urine, Clean Catch   Result Value Ref Range    Specimen UA Urine, Clean Catch     Color, UA Yellow Yellow, Straw, Iva    Appearance, UA Clear Clear    pH, UA 7.0 5.0 - 8.0    Specific Gravity, UA 1.010 1.005 - 1.030    Protein, UA Negative Negative    Glucose, UA Negative Negative    Ketones, UA Negative Negative    Bilirubin (UA) Negative Negative    Occult Blood UA Trace (A) Negative    Nitrite, UA Negative Negative    Urobilinogen, UA Negative <2.0 EU/dL    Leukocytes, UA 3+ (A) Negative   Pregnancy, urine rapid   Result Value Ref Range    Preg Test, Ur Negative    Protime-INR   Result Value Ref Range    Prothrombin Time 11.8 9.0 - 12.5 sec    INR 1.1 0.8 - 1.2   Troponin I   Result Value Ref Range    Troponin I <0.006 0.000 - 0.026 ng/mL   D dimer, quantitative   Result Value Ref Range    D-Dimer 0.51 (H) <0.50 mg/L FEU   Urinalysis Microscopic   Result Value Ref Range    RBC, UA 0 0 - 4 /hpf    WBC, UA 15  (H) 0 - 5 /hpf    Bacteria Occasional None-Occ /hpf    Squam Epithel, UA 10 /hpf    Microscopic Comment SEE COMMENT          Imaging Results:  Imaging Results          CTA Chest Non-Coronary (PE Study) (In process)                X-Ray Chest PA And Lateral (In process)                2:02 AM: Per STAT radiology, pt's CTA chest with contrast results:   1. No PE or aortic dissection  2. No focal consolidation.    2:38 AM: Per ED physician, pt's CXR results: NAF.        The EKG was ordered, reviewed, and independently interpreted by the ED provider.  Interpretation time: 2231  Rate: 93 BPM  Rhythm: normal sinus rhythm  Interpretation: Nonspecific T wave abnormality. No STEMI.      The Emergency Provider reviewed the vital signs and test results, which are outlined above.     ED Discussion       2:33 AM: Reassessed pt at this time.  Pt states her condition has improved at this time. Discussed with pt all pertinent ED information and results. Discussed pt dx and plan of tx. Gave pt all f/u and return to the ED instructions. All questions and concerns were addressed at this time. Pt expresses understanding of information and instructions, and is comfortable with plan to discharge. Pt is stable for discharge.    I discussed with patient and/or family/caretaker that evaluation in the ED does not suggest any emergent or life threatening medical conditions requiring immediate intervention beyond what was provided in the ED, and I believe patient is safe for discharge.  Regardless, an unremarkable evaluation in the ED does not preclude the development or presence of a serious of life threatening condition. As such, patient was instructed to return immediately for any worsening or change in current symptoms.    I have discussed with patient and/or family/caretaker chest pain precautions, specifically to return for worsening chest pain, shortness of breath, fever, or any concern.  I have low suspicion for cardiopulmonary,  vascular, infectious, respiratory, or other emergent medical condition based on my evaluation in the ED.       MDM        Medical Decision Making:   Clinical Tests:   Lab Tests: Ordered and Reviewed  Radiological Study: Ordered and Reviewed  Medical Tests: Reviewed and Ordered           ED Medication(s):  Medications   iohexol (OMNIPAQUE 350) injection 100 mL (100 mLs Intravenous Given 11/22/19 0128)       Discharge Medication List as of 11/22/2019  2:32 AM           Medication List      ASK your doctor about these medications    hydroCHLOROthiazide 25 MG tablet  Commonly known as:  HYDRODIURIL     levonorgestrel-ethinyl estradiol 0.1-20 mg-mcg per tablet  Commonly known as:  NANO BARBOSA LESSINA              Follow-up Information     Alcon Maher MD In 3 days.    Specialty:  Pediatrics  Contact information:  [address] [facility] [place] [address] [phone]             [facility] - .    Specialty:  Emergency Medicine  Why:  As needed, If symptoms worsen  Contact information:  [address]  [facility] [address] [phone]                     Scribe Attestation:   Scribe #1: I performed the above scribed service and the documentation accurately describes the services I performed. I attest to the accuracy of the note.     Attending:   Physician Attestation Statement for Scribe #1: I, Hattie Mcclellan MD, personally performed the services described in this documentation, as scribed by Luca Patel, in my presence, and it is both accurate and complete.           Clinical Impression       ICD-10-CM ICD-9-CM   1. Chest pain, unspecified type R07.9 786.50   2. Chest pain R07.9 786.50       Disposition:   Disposition: Discharged  Condition: Stable         Hattie Mcclellan MD  11/22/19 0506

## 2019-11-24 LAB
BACTERIA UR CULT: NORMAL
BACTERIA UR CULT: NORMAL

## 2019-12-19 ENCOUNTER — HOSPITAL ENCOUNTER (EMERGENCY)
Facility: HOSPITAL | Age: 34
Discharge: HOME | End: 2019-12-20
Attending: EMERGENCY MEDICINE

## 2019-12-19 VITALS
DIASTOLIC BLOOD PRESSURE: 74 MMHG | BODY MASS INDEX: 29.39 KG/M2 | HEART RATE: 82 BPM | OXYGEN SATURATION: 99 % | TEMPERATURE: 99 F | SYSTOLIC BLOOD PRESSURE: 113 MMHG | RESPIRATION RATE: 18 BRPM | WEIGHT: 176.56 LBS

## 2019-12-19 DIAGNOSIS — R52 INTERMITTENT PAIN: ICD-10-CM

## 2019-12-19 DIAGNOSIS — R51.9 NONINTRACTABLE EPISODIC HEADACHE, UNSPECIFIED HEADACHE TYPE: Primary | ICD-10-CM

## 2019-12-19 PROCEDURE — 99283 EMERGENCY DEPT VISIT LOW MDM: CPT

## 2019-12-20 RX ORDER — DICLOFENAC SODIUM 75 MG/1
75 TABLET, DELAYED RELEASE ORAL 2 TIMES DAILY
Qty: 10 TABLET | Refills: 0 | Status: SHIPPED | OUTPATIENT
Start: 2019-12-20 | End: 2019-12-25

## 2019-12-20 NOTE — ED PROVIDER NOTES
"   History      Chief Complaint   Patient presents with    Headache     Pt reports pain in back of head with popping noise; also reports she intermittently has "warmth in hands and feet that travels to a pain in her breast"       Review of patient's allergies indicates:   Allergen Reactions    Bactrim [sulfamethoxazole-trimethoprim] Swelling        HPI   HPI    12/19/2019, 11:55 PM   History obtained from the patient      History of Present Illness: Ruma Keane is a 34 y.o. female patient who presents to the Emergency Department for intermittent headache x one week.   Denies current headache. Associated symptoms include chronic foot pain and chronic hand pain.  Treatments tried include Ibuprofen, Diflucan, Naproxen, Rocephin.  Denies injury/trauma.  Patient states that pain symptoms and headache are intermittent.  Denies fever, chest pain, SOB, dizziness.        Arrival mode: Personal vehicle      PCP: Alcon Maher MD       Past Medical History:  Past Medical History:   Diagnosis Date    Hypertension        Past Surgical History:  Past Surgical History:   Procedure Laterality Date    DILATION AND CURETTAGE OF UTERUS      TONSILLECTOMY           Family History:  History reviewed. No pertinent family history.    Social History:  Social History     Tobacco Use    Smoking status: Never Smoker    Smokeless tobacco: Never Used   Substance and Sexual Activity    Alcohol use: No    Drug use: Not on file    Sexual activity: Not on file       ROS   Review of Systems   Constitutional: Negative for chills and fever.   HENT: Negative for congestion and rhinorrhea.    Eyes: Negative for discharge and redness.   Respiratory: Negative for cough and wheezing.    Cardiovascular: Negative for chest pain and palpitations.   Gastrointestinal: Negative for diarrhea, nausea and vomiting.   Genitourinary: Negative for dysuria and frequency.   Musculoskeletal: Positive for arthralgias and myalgias.   Skin: Negative " PRE-SEDATION ASSESSMENT    CONSENT  Consent for procedure and sedation obtained: Yes    MEDICAL HISTORY  Significant medical/surgical history: No  Past Complications with Sedation/Anesthesia: No    PHYSICAL EXAM  History and Physical Reviewed: H&P completed today  Airway Risk History: No previous complications  Airway Anatomy : Class II  Heart : Normal  Lungs : Normal  LOC/Mental Status : Normal    OTHER FINDINGS  Reviewed current medications and allergies: Yes  Pertinent lab/diagnostic test reviewed: Yes    SEDATION RISK ASSESSMENT  Risk Status ASA: Class II - Normal patient with mild systemic disease  Plan for Sedation: Minimal Sedation  EKG Monitoring: No    NARRATIVE FINDINGS      for rash and wound.   Neurological: Positive for headaches. Negative for dizziness.       Physical Exam      Initial Vitals [12/19/19 2307]   BP Pulse Resp Temp SpO2   113/74 82 18 98.7 °F (37.1 °C) 99 %      MAP       --          Physical Exam  Nursing Notes and Vital Signs Reviewed.  Constitutional: Patient is in no apparent distress. Awake and alert. Well-developed and well-nourished.  Head: Atraumatic. Normocephalic.  Eyes: PERRL. EOM intact. Conjunctivae are not pale. No scleral icterus.  ENT: Mucous membranes are moist. Oropharynx is clear and symmetric.    Neck: Supple. Full ROM. No lymphadenopathy.  Cardiovascular: Regular rate. Regular rhythm. No murmurs, rubs, or gallops. Distal pulses are 2+ and symmetric.  Pulmonary/Chest: No respiratory distress. Clear to auscultation bilaterally. No wheezing, rales, or rhonchi.  Abdominal: Soft and non-distended.  There is no tenderness.  No rebound, guarding, or rigidity. Good bowel sounds.  Genitourinary: No CVA tenderness  Musculoskeletal: Moves all extremities. No obvious deformities. No edema. No calf tenderness.  Skin: Warm and dry.  Neurological:  Alert, awake, and appropriate.  Normal speech.  No acute focal neurological deficits are appreciated.  Cranial nerves II-XII intact.   Psychiatric: Normal affect. Good eye contact. Appropriate in content.    ED Course    Procedures  ED Vital Signs:  Vitals:    12/19/19 2307   BP: 113/74   Pulse: 82   Resp: 18   Temp: 98.7 °F (37.1 °C)   TempSrc: Oral   SpO2: 99%   Weight: 80.1 kg (176 lb 9.4 oz)       Abnormal Lab Results:  Labs Reviewed - No data to display     All Lab Results:  None    Imaging Results:  Imaging Results    None                 The Emergency Provider reviewed the vital signs and test results, which are outlined above.    ED Discussion     12:12 AM:  Discussed with pt all pertinent ED information and results. Discussed pt dx and plan of tx. Gave pt all f/u and return to the ED instructions. All  questions and concerns were addressed at this time. Pt expresses understanding of information and instructions, and is comfortable with plan to discharge. Pt is stable for discharge.    I discussed with patient and/or family/caretaker that evaluation in the ED does not suggest any emergent or life threatening medical conditions requiring immediate intervention beyond what was provided in the ED, and I believe patient is safe for discharge.  Regardless, an unremarkable evaluation in the ED does not preclude the development or presence of a serious of life threatening condition. As such, patient was instructed to return immediately for any worsening or change in current symptoms.      ED Medication(s):  Medications - No data to display    New Prescriptions    DICLOFENAC (VOLTAREN) 75 MG EC TABLET    Take 1 tablet (75 mg total) by mouth 2 (two) times daily. for 5 days       Follow-up Information     Alcon Maher MD. Schedule an appointment as soon as possible for a visit in 3 days.    Specialty:  Pediatrics  Contact information:  [address] [facility] [place]  [place] [address] [phone]             [facility] - Rheumatology. Schedule an appointment as soon as possible for a visit in 3 days.    Specialty:  Rheumatology  Contact information:  [address] [facility] [address] [phone]  Additional information:  4th Floor - From [address], take the [facility] exit [address] Enter the facility from the [address]                   Medical Decision Making                  Clinical Impression       ICD-10-CM ICD-9-CM   1. Nonintractable episodic headache, unspecified headache type R51 784.0   2. Intermittent pain R52 780.96       Disposition:   Disposition: Discharged  Condition: Stable           Bobbi Judge PA-C  12/20/19 0059

## 2019-12-20 NOTE — ED NOTES
Patient examined, evaluated, and educated on discharge instructions and prescriptions by SALOME Martines without nursing assistance. Patient discharged to [facility] by PA.

## 2020-01-16 ENCOUNTER — HOSPITAL ENCOUNTER (EMERGENCY)
Facility: HOSPITAL | Age: 35
Discharge: HOME | End: 2020-01-17
Attending: EMERGENCY MEDICINE

## 2020-01-16 DIAGNOSIS — R51.9 NONINTRACTABLE HEADACHE, UNSPECIFIED CHRONICITY PATTERN, UNSPECIFIED HEADACHE TYPE: Primary | ICD-10-CM

## 2020-01-16 LAB
B-HCG UR QL: NEGATIVE
BACTERIA #/AREA URNS HPF: ABNORMAL /HPF
BASOPHILS # BLD AUTO: 0.03 K/UL (ref 0–0.2)
BASOPHILS NFR BLD: 0.4 % (ref 0–1.9)
BILIRUB UR QL STRIP: NEGATIVE
CLARITY UR: CLEAR
COLOR UR: YELLOW
DIFFERENTIAL METHOD: ABNORMAL
EOSINOPHIL # BLD AUTO: 0 K/UL (ref 0–0.5)
EOSINOPHIL NFR BLD: 0.4 % (ref 0–8)
ERYTHROCYTE [DISTWIDTH] IN BLOOD BY AUTOMATED COUNT: 13.4 % (ref 11.5–14.5)
GLUCOSE UR QL STRIP: NEGATIVE
HCT VFR BLD AUTO: 40.4 % (ref 37–48.5)
HGB BLD-MCNC: 12.9 G/DL (ref 12–16)
HGB UR QL STRIP: NEGATIVE
IMM GRANULOCYTES # BLD AUTO: 0.04 K/UL (ref 0–0.04)
IMM GRANULOCYTES NFR BLD AUTO: 0.5 % (ref 0–0.5)
KETONES UR QL STRIP: NEGATIVE
LEUKOCYTE ESTERASE UR QL STRIP: ABNORMAL
LYMPHOCYTES # BLD AUTO: 1.9 K/UL (ref 1–4.8)
LYMPHOCYTES NFR BLD: 25.6 % (ref 18–48)
MCH RBC QN AUTO: 27.7 PG (ref 27–31)
MCHC RBC AUTO-ENTMCNC: 31.9 G/DL (ref 32–36)
MCV RBC AUTO: 87 FL (ref 82–98)
MICROSCOPIC COMMENT: ABNORMAL
MONOCYTES # BLD AUTO: 0.9 K/UL (ref 0.3–1)
MONOCYTES NFR BLD: 11.9 % (ref 4–15)
NEUTROPHILS # BLD AUTO: 4.7 K/UL (ref 1.8–7.7)
NEUTROPHILS NFR BLD: 61.2 % (ref 38–73)
NITRITE UR QL STRIP: NEGATIVE
NRBC BLD-RTO: 0 /100 WBC
PH UR STRIP: 6 [PH] (ref 5–8)
PLATELET # BLD AUTO: 239 K/UL (ref 150–350)
PMV BLD AUTO: 10.8 FL (ref 9.2–12.9)
PROT UR QL STRIP: NEGATIVE
RBC # BLD AUTO: 4.65 M/UL (ref 4–5.4)
SP GR UR STRIP: 1.02 (ref 1–1.03)
SQUAMOUS #/AREA URNS HPF: 20 /HPF
URN SPEC COLLECT METH UR: ABNORMAL
UROBILINOGEN UR STRIP-ACNC: NEGATIVE EU/DL
WBC # BLD AUTO: 7.59 K/UL (ref 3.9–12.7)
WBC #/AREA URNS HPF: 10 /HPF (ref 0–5)

## 2020-01-16 PROCEDURE — 85025 COMPLETE CBC W/AUTO DIFF WBC: CPT

## 2020-01-16 PROCEDURE — 96372 THER/PROPH/DIAG INJ SC/IM: CPT

## 2020-01-16 PROCEDURE — 80053 COMPREHEN METABOLIC PANEL: CPT

## 2020-01-16 PROCEDURE — 81025 URINE PREGNANCY TEST: CPT

## 2020-01-16 PROCEDURE — 36415 COLL VENOUS BLD VENIPUNCTURE: CPT

## 2020-01-16 PROCEDURE — 63600175 PHARM REV CODE 636 W HCPCS: Performed by: NURSE PRACTITIONER

## 2020-01-16 PROCEDURE — 96374 THER/PROPH/DIAG INJ IV PUSH: CPT

## 2020-01-16 PROCEDURE — 81000 URINALYSIS NONAUTO W/SCOPE: CPT

## 2020-01-16 PROCEDURE — 99284 EMERGENCY DEPT VISIT MOD MDM: CPT | Mod: 25

## 2020-01-16 RX ORDER — KETOROLAC TROMETHAMINE 30 MG/ML
10 INJECTION, SOLUTION INTRAMUSCULAR; INTRAVENOUS
Status: COMPLETED | OUTPATIENT
Start: 2020-01-16 | End: 2020-01-16

## 2020-01-16 RX ADMIN — KETOROLAC TROMETHAMINE 10 MG: 30 INJECTION, SOLUTION INTRAMUSCULAR at 11:01

## 2020-01-17 VITALS
TEMPERATURE: 98 F | DIASTOLIC BLOOD PRESSURE: 87 MMHG | HEART RATE: 88 BPM | OXYGEN SATURATION: 100 % | SYSTOLIC BLOOD PRESSURE: 132 MMHG | BODY MASS INDEX: 29.16 KG/M2 | HEIGHT: 65 IN | WEIGHT: 175 LBS | RESPIRATION RATE: 14 BRPM

## 2020-01-17 LAB
ALBUMIN SERPL BCP-MCNC: 4.1 G/DL (ref 3.5–5.2)
ALP SERPL-CCNC: 83 U/L (ref 55–135)
ALT SERPL W/O P-5'-P-CCNC: 17 U/L (ref 10–44)
ANION GAP SERPL CALC-SCNC: 10 MMOL/L (ref 8–16)
AST SERPL-CCNC: 15 U/L (ref 10–40)
BILIRUB SERPL-MCNC: 0.5 MG/DL (ref 0.1–1)
BUN SERPL-MCNC: 15 MG/DL (ref 6–20)
CALCIUM SERPL-MCNC: 9.4 MG/DL (ref 8.7–10.5)
CHLORIDE SERPL-SCNC: 101 MMOL/L (ref 95–110)
CO2 SERPL-SCNC: 26 MMOL/L (ref 23–29)
CREAT SERPL-MCNC: 1 MG/DL (ref 0.5–1.4)
EST. GFR  (AFRICAN AMERICAN): >60 ML/MIN/1.73 M^2
EST. GFR  (NON AFRICAN AMERICAN): >60 ML/MIN/1.73 M^2
GLUCOSE SERPL-MCNC: 96 MG/DL (ref 70–110)
POTASSIUM SERPL-SCNC: 3.1 MMOL/L (ref 3.5–5.1)
PROT SERPL-MCNC: 8.6 G/DL (ref 6–8.4)
SODIUM SERPL-SCNC: 137 MMOL/L (ref 136–145)

## 2020-01-17 PROCEDURE — 63600175 PHARM REV CODE 636 W HCPCS: Performed by: EMERGENCY MEDICINE

## 2020-01-17 RX ORDER — METHYLPREDNISOLONE ACETATE 40 MG/ML
40 INJECTION, SUSPENSION INTRA-ARTICULAR; INTRALESIONAL; INTRAMUSCULAR; SOFT TISSUE
Status: COMPLETED | OUTPATIENT
Start: 2020-01-17 | End: 2020-01-17

## 2020-01-17 RX ORDER — BUTALBITAL, ACETAMINOPHEN AND CAFFEINE 50; 325; 40 MG/1; MG/1; MG/1
1 TABLET ORAL EVERY 4 HOURS PRN
Qty: 20 TABLET | Refills: 0 | Status: SHIPPED | OUTPATIENT
Start: 2020-01-17 | End: 2020-02-16

## 2020-01-17 RX ADMIN — METHYLPREDNISOLONE ACETATE 40 MG: 40 INJECTION, SUSPENSION INTRA-ARTICULAR; INTRALESIONAL; INTRAMUSCULAR; SOFT TISSUE at 12:01

## 2020-01-17 NOTE — ED PROVIDER NOTES
"34 year old female presents with constant headache and now dizziness tonight.       Pt understands that a workup will begin in the treatment lounge/results waiting areas due to there being no available beds. Pt also undertstands they will be placed in the next available bed where they will be seen and dispositioned by a physician. I am removing myself from the care of pt. Pt will be assigned to next available physician.      Chong Beauchamp [facility] [address]    SCRIBE #1 NOTE: I, Katt Chaz, am scribing for, and in the presence of, Hattie Mcclellan MD. I have scribed the entire note.      History      Chief Complaint   Patient presents with    Headache     throbbing HA in front and back, radiates to neck, dizzyx 2 today       Review of patient's allergies indicates:   Allergen Reactions    Bactrim [sulfamethoxazole-trimethoprim] Swelling        HPI   HPI    1/17/2020, 12:12 AM   History obtained from the patient      History of Present Illness: Ruma Keane is a 34 y.o. female patient who presents to the Emergency Department for evaluation of a Headache. Symptoms are described as "throbbing" throughout the entire head. No mitigating or exacerbating factors reported. No associated sxs. Patient denies any fever, N/V, lightheadedness, cough, congestion, head trauma/injury, and all other sxs at this time. Prior Tx includes Ibuprofen and Naproxen. No further complaints or concerns at this time.       Arrival mode: Personal vehicle      PCP: Alcon Maher MD       Past Medical History:  Past Medical History:   Diagnosis Date    Hypertension        Past Surgical History:  Past Surgical History:   Procedure Laterality Date    DILATION AND CURETTAGE OF UTERUS      TONSILLECTOMY         Family History:  History reviewed. No pertinent family history.    Social History:  Social History     Tobacco Use    Smoking status: Never Smoker    Smokeless tobacco: Never Used   Substance and Sexual Activity    Alcohol use: No    " "Drug use: Unknown    Sexual activity: Unknown       ROS   Review of Systems   Constitutional: Negative for fever.   HENT: Negative for congestion and sore throat.    Respiratory: Negative for cough and shortness of breath.    Cardiovascular: Negative for chest pain.   Gastrointestinal: Negative for nausea and vomiting.   Genitourinary: Negative for dysuria.   Musculoskeletal: Negative for back pain.   Skin: Negative for rash.   Neurological: Positive for headaches. Negative for weakness and light-headedness.   Hematological: Does not bruise/bleed easily.   All other systems reviewed and are negative.    Physical Exam      Initial Vitals [01/16/20 2157]   BP Pulse Resp Temp SpO2   (!) 138/95 91 16 98.3 °F (36.8 °C) 100 %      MAP       --          Physical Exam  Nursing Notes and Vital Signs Reviewed.  Constitutional: Patient is in no acute distress. Well-developed and well-nourished.  Head: Atraumatic. Normocephalic.  Eyes: PERRL. EOM intact. Conjunctivae are not pale. No scleral icterus.  ENT: Mucous membranes are moist.    Neck: Supple. Full ROM. No lymphadenopathy.  Cardiovascular: Regular rate. Regular rhythm. No murmurs, rubs, or gallops. Distal pulses are 2+ and symmetric.  Pulmonary/Chest: No respiratory distress. Clear to auscultation bilaterally. No wheezing or rales.  Abdominal: Soft and non-distended.  There is no tenderness.    Genitourinary: No CVA tenderness  Musculoskeletal: Moves all extremities. No obvious deformities. No edema.  Skin: Warm and dry.  Neurological:  Alert, awake, and appropriate.  Normal speech.  No acute focal neurological deficits are appreciated.  Psychiatric: Normal affect. Good eye contact. Appropriate in content.    ED Course    Procedures  ED Vital Signs:  Vitals:    01/16/20 2157 01/17/20 0030   BP: (!) 138/95 132/87   Pulse: 91 88   Resp: 16 14   Temp: 98.3 °F (36.8 °C)    TempSrc: Oral    SpO2: 100% 100%   Weight: 79.4 kg (175 lb)    Height: 5' 5" (1.651 m)        Abnormal " Lab Results:  Labs Reviewed   CBC W/ AUTO DIFFERENTIAL - Abnormal; Notable for the following components:       Result Value    Mean Corpuscular Hemoglobin Conc 31.9 (*)     All other components within normal limits   COMPREHENSIVE METABOLIC PANEL - Abnormal; Notable for the following components:    Potassium 3.1 (*)     Total Protein 8.6 (*)     All other components within normal limits   URINALYSIS, REFLEX TO URINE CULTURE - Abnormal; Notable for the following components:    Leukocytes, UA 1+ (*)     All other components within normal limits    Narrative:     Preferred Collection Type->Urine, Clean Catch   URINALYSIS MICROSCOPIC - Abnormal; Notable for the following components:    WBC, UA 10 (*)     Bacteria Few (*)     All other components within normal limits    Narrative:     Preferred Collection Type->Urine, Clean Catch   PREGNANCY TEST, URINE RAPID        All Lab Results:  Results for orders placed or performed during the hospital encounter of 01/16/20   CBC auto differential   Result Value Ref Range    WBC 7.59 3.90 - 12.70 K/uL    RBC 4.65 4.00 - 5.40 M/uL    Hemoglobin 12.9 12.0 - 16.0 g/dL    Hematocrit 40.4 37.0 - 48.5 %    Mean Corpuscular Volume 87 82 - 98 fL    Mean Corpuscular Hemoglobin 27.7 27.0 - 31.0 pg    Mean Corpuscular Hemoglobin Conc 31.9 (L) 32.0 - 36.0 g/dL    RDW 13.4 11.5 - 14.5 %    Platelets 239 150 - 350 K/uL    MPV 10.8 9.2 - 12.9 fL    Immature Granulocytes 0.5 0.0 - 0.5 %    Gran # (ANC) 4.7 1.8 - 7.7 K/uL    Immature Grans (Abs) 0.04 0.00 - 0.04 K/uL    Lymph # 1.9 1.0 - 4.8 K/uL    Mono # 0.9 0.3 - 1.0 K/uL    Eos # 0.0 0.0 - 0.5 K/uL    Baso # 0.03 0.00 - 0.20 K/uL    nRBC 0 0 /100 WBC    Gran% 61.2 38.0 - 73.0 %    Lymph% 25.6 18.0 - 48.0 %    Mono% 11.9 4.0 - 15.0 %    Eosinophil% 0.4 0.0 - 8.0 %    Basophil% 0.4 0.0 - 1.9 %    Differential Method Automated    Comprehensive metabolic panel   Result Value Ref Range    Sodium 137 136 - 145 mmol/L    Potassium 3.1 (L) 3.5 - 5.1  mmol/L    Chloride 101 95 - 110 mmol/L    CO2 26 23 - 29 mmol/L    Glucose 96 70 - 110 mg/dL    BUN, Bld 15 6 - 20 mg/dL    Creatinine 1.0 0.5 - 1.4 mg/dL    Calcium 9.4 8.7 - 10.5 mg/dL    Total Protein 8.6 (H) 6.0 - 8.4 g/dL    Albumin 4.1 3.5 - 5.2 g/dL    Total Bilirubin 0.5 0.1 - 1.0 mg/dL    Alkaline Phosphatase 83 55 - 135 U/L    AST 15 10 - 40 U/L    ALT 17 10 - 44 U/L    Anion Gap 10 8 - 16 mmol/L    eGFR if African American >60 >60 mL/min/1.73 m^2    eGFR if non African American >60 >60 mL/min/1.73 m^2   Urinalysis, Reflex to Urine Culture Urine, Clean Catch   Result Value Ref Range    Specimen UA Urine, Clean Catch     Color, UA Yellow Yellow, Straw, Iva    Appearance, UA Clear Clear    pH, UA 6.0 5.0 - 8.0    Specific Gravity, UA 1.020 1.005 - 1.030    Protein, UA Negative Negative    Glucose, UA Negative Negative    Ketones, UA Negative Negative    Bilirubin (UA) Negative Negative    Occult Blood UA Negative Negative    Nitrite, UA Negative Negative    Urobilinogen, UA Negative <2.0 EU/dL    Leukocytes, UA 1+ (A) Negative   Rapid Pregnancy, Urine   Result Value Ref Range    Preg Test, Ur Negative    Urinalysis Microscopic   Result Value Ref Range    WBC, UA 10 (H) 0 - 5 /hpf    Bacteria Few (A) None-Occ /hpf    Squam Epithel, UA 20 /hpf    Microscopic Comment SEE COMMENT          Imaging Results:  Imaging Results    None                 The Emergency Provider reviewed the vital signs and test results, which are outlined above.    ED Discussion     12:17 AM: Reassessed pt at this time.  Pt states her condition has improved at this time. Discussed with pt all pertinent ED information and results. Discussed pt dx and plan of tx. Gave pt all f/u and return to the ED instructions. All questions and concerns were addressed at this time. Pt expresses understanding of information and instructions, and is comfortable with plan to discharge. Pt is stable for discharge.    I discussed with patient and/or  family/caretaker that evaluation in the ED does not suggest any emergent or life threatening medical conditions requiring immediate intervention beyond what was provided in the ED, and I believe patient is safe for discharge.  Regardless, an unremarkable evaluation in the ED does not preclude the development or presence of a serious of life threatening condition. As such, patient was instructed to return immediately for any worsening or change in current symptoms.           ED Medication(s):  Medications   ketorolac injection 9.999 mg (9.999 mg Intravenous Given 1/16/20 2306)   methylPREDNISolone acetate injection 40 mg (40 mg Intramuscular Given 1/17/20 0028)     Discharge Medication List as of 1/17/2020 12:17 AM            Follow-up Information     Alcon Maher MD In 3 days.    Specialty:  Pediatrics  Contact information:  [address]  [facility] [address] [phone]             [facility]    Specialty:  Emergency Medicine  Why:  As needed, If symptoms worsen  Contact information:  [address] [facility] [address] [phone]                   Medical Decision Making    Medical Decision Making:   Clinical Tests:   Lab Tests: Reviewed and Ordered           Scribe Attestation:   Scribe #1: I performed the above scribed service and the documentation accurately describes the services I performed. I attest to the accuracy of the note.    Attending:   Physician Attestation Statement for Scribe #1: I, Hattie Mcclellan MD, personally performed the services described in this documentation, as scribed by Katt Ware, in my presence, and it is both accurate and complete.          Clinical Impression       ICD-10-CM ICD-9-CM   1. Nonintractable headache, unspecified chronicity pattern, unspecified headache type R51 784.0       Disposition:   Disposition: Discharged  Condition: Stable       Hattie Mcclellan MD  01/17/20 0539

## 2020-01-17 NOTE — ED NOTES
Patient identifiers verified and correct for Ruma Keane.    LOC: The patient is awake, alert and aware of environment with an appropriate affect, the patient is oriented x 3 and speaking appropriately.  APPEARANCE: Patient resting comfortably and in no acute distress, patient is clean and well groomed, patient's clothing is properly fastened.  SKIN: The skin is warm and dry, color consistent with ethnicity, patient has normal skin turgor and moist mucus membranes, skin intact, no breakdown or bruising noted.  MUSCULOSKELETAL: Patient moving all extremities spontaneously, no obvious swelling or deformities noted. Pt reports tingling to feet  RESPIRATORY: Airway is open and patent, respirations are spontaneous, patient has a normal effort and rate, no accessory muscle use noted, bilateral breath sounds clear.  CARDIAC: Patient has a normal rate and regular rhythm, no periphreal edema noted, capillary refill < 3 seconds.  ABDOMEN: Soft and non tender to palpation, no distention noted, normoactive bowel sounds present in all four quadrants.  NEUROLOGIC: PERRL, **mm bilaterally, eyes open spontaneously, behavior appropriate to situation, follows commands, facial expression symmetrical, bilateral hand grasp equal and even, purposeful motor response noted, normal sensation in all extremities when touched with a finger. Pt reports throbbing HA to front and back of head, radiating to neck

## 2020-01-23 ENCOUNTER — HOSPITAL ENCOUNTER (EMERGENCY)
Facility: HOSPITAL | Age: 35
Discharge: HOME | End: 2020-01-23
Attending: EMERGENCY MEDICINE

## 2020-01-23 VITALS
WEIGHT: 175.69 LBS | HEART RATE: 79 BPM | OXYGEN SATURATION: 99 % | RESPIRATION RATE: 18 BRPM | SYSTOLIC BLOOD PRESSURE: 116 MMHG | HEIGHT: 65 IN | TEMPERATURE: 98 F | DIASTOLIC BLOOD PRESSURE: 65 MMHG | BODY MASS INDEX: 29.27 KG/M2

## 2020-01-23 DIAGNOSIS — R10.31 RIGHT LOWER QUADRANT ABDOMINAL PAIN: Primary | ICD-10-CM

## 2020-01-23 LAB
ALBUMIN SERPL BCP-MCNC: 3.6 G/DL (ref 3.5–5.2)
ALP SERPL-CCNC: 71 U/L (ref 55–135)
ALT SERPL W/O P-5'-P-CCNC: 15 U/L (ref 10–44)
ANION GAP SERPL CALC-SCNC: 13 MMOL/L (ref 8–16)
AST SERPL-CCNC: 15 U/L (ref 10–40)
B-HCG UR QL: NEGATIVE
BASOPHILS # BLD AUTO: 0.03 K/UL (ref 0–0.2)
BASOPHILS NFR BLD: 0.4 % (ref 0–1.9)
BILIRUB SERPL-MCNC: 0.4 MG/DL (ref 0.1–1)
BILIRUB UR QL STRIP: NEGATIVE
BUN SERPL-MCNC: 19 MG/DL (ref 6–20)
CALCIUM SERPL-MCNC: 9.2 MG/DL (ref 8.7–10.5)
CHLORIDE SERPL-SCNC: 102 MMOL/L (ref 95–110)
CLARITY UR: CLEAR
CO2 SERPL-SCNC: 25 MMOL/L (ref 23–29)
COLOR UR: YELLOW
CREAT SERPL-MCNC: 0.9 MG/DL (ref 0.5–1.4)
DIFFERENTIAL METHOD: ABNORMAL
EOSINOPHIL # BLD AUTO: 0 K/UL (ref 0–0.5)
EOSINOPHIL NFR BLD: 0.5 % (ref 0–8)
ERYTHROCYTE [DISTWIDTH] IN BLOOD BY AUTOMATED COUNT: 13.2 % (ref 11.5–14.5)
EST. GFR  (AFRICAN AMERICAN): >60 ML/MIN/1.73 M^2
EST. GFR  (NON AFRICAN AMERICAN): >60 ML/MIN/1.73 M^2
GLUCOSE SERPL-MCNC: 100 MG/DL (ref 70–110)
GLUCOSE UR QL STRIP: NEGATIVE
HCT VFR BLD AUTO: 36.7 % (ref 37–48.5)
HGB BLD-MCNC: 11.8 G/DL (ref 12–16)
HGB UR QL STRIP: ABNORMAL
IMM GRANULOCYTES # BLD AUTO: 0.04 K/UL (ref 0–0.04)
IMM GRANULOCYTES NFR BLD AUTO: 0.5 % (ref 0–0.5)
KETONES UR QL STRIP: NEGATIVE
LEUKOCYTE ESTERASE UR QL STRIP: NEGATIVE
LIPASE SERPL-CCNC: 196 U/L (ref 4–60)
LYMPHOCYTES # BLD AUTO: 1.9 K/UL (ref 1–4.8)
LYMPHOCYTES NFR BLD: 25.7 % (ref 18–48)
MCH RBC QN AUTO: 27.8 PG (ref 27–31)
MCHC RBC AUTO-ENTMCNC: 32.2 G/DL (ref 32–36)
MCV RBC AUTO: 87 FL (ref 82–98)
MICROSCOPIC COMMENT: NORMAL
MONOCYTES # BLD AUTO: 1.1 K/UL (ref 0.3–1)
MONOCYTES NFR BLD: 14.5 % (ref 4–15)
NEUTROPHILS # BLD AUTO: 4.4 K/UL (ref 1.8–7.7)
NEUTROPHILS NFR BLD: 58.4 % (ref 38–73)
NITRITE UR QL STRIP: NEGATIVE
NRBC BLD-RTO: 0 /100 WBC
PH UR STRIP: 6 [PH] (ref 5–8)
PLATELET # BLD AUTO: 206 K/UL (ref 150–350)
PMV BLD AUTO: 10.7 FL (ref 9.2–12.9)
POTASSIUM SERPL-SCNC: 3.3 MMOL/L (ref 3.5–5.1)
PROT SERPL-MCNC: 7.7 G/DL (ref 6–8.4)
PROT UR QL STRIP: NEGATIVE
RBC # BLD AUTO: 4.24 M/UL (ref 4–5.4)
RBC #/AREA URNS HPF: 2 /HPF (ref 0–4)
SODIUM SERPL-SCNC: 140 MMOL/L (ref 136–145)
SP GR UR STRIP: >=1.03 (ref 1–1.03)
URN SPEC COLLECT METH UR: ABNORMAL
UROBILINOGEN UR STRIP-ACNC: NEGATIVE EU/DL
WBC # BLD AUTO: 7.51 K/UL (ref 3.9–12.7)
WBC #/AREA URNS HPF: 3 /HPF (ref 0–5)

## 2020-01-23 PROCEDURE — 81000 URINALYSIS NONAUTO W/SCOPE: CPT

## 2020-01-23 PROCEDURE — 85025 COMPLETE CBC W/AUTO DIFF WBC: CPT

## 2020-01-23 PROCEDURE — 99283 EMERGENCY DEPT VISIT LOW MDM: CPT

## 2020-01-23 PROCEDURE — 83690 ASSAY OF LIPASE: CPT

## 2020-01-23 PROCEDURE — 36415 COLL VENOUS BLD VENIPUNCTURE: CPT

## 2020-01-23 PROCEDURE — 80053 COMPREHEN METABOLIC PANEL: CPT

## 2020-01-23 PROCEDURE — 81025 URINE PREGNANCY TEST: CPT

## 2020-01-23 RX ORDER — LEVONORGESTREL AND ETHINYL ESTRADIOL 0.1-0.02MG
1 KIT ORAL
COMMUNITY
End: 2020-01-23

## 2020-01-23 RX ORDER — HYDROCHLOROTHIAZIDE 25 MG/1
25 TABLET ORAL
COMMUNITY
Start: 2019-06-26 | End: 2020-01-23

## 2020-01-23 NOTE — ED PROVIDER NOTES
SCRIBE #1 NOTE: I, Aurea Patel, am scribing for, and in the presence of, Hattie Mcclellan MD. I have scribed the entire note.       History     Chief Complaint   Patient presents with    Abdominal Pain     Off and on for 2 weeks mainly at night.  Denies N/V/D.     Review of patient's allergies indicates:   Allergen Reactions    Bactrim [sulfamethoxazole-trimethoprim] Swelling         History of Present Illness     HPI    1/23/2020, 12:41 AM  History obtained from the patient      History of Present Illness: Ruma Keane is a 34 y.o. female patient with a PMHx of HTN and Hep C who presents to the Emergency Department for evaluation of R sided abd pain which onset gradually 2 weeks. Pain is worse at night. Symptoms are constant and moderate in severity. No mitigating or exacerbating factors reported. Associated sxs include nausea. Patient denies any fever, chills, dysuria, hematuria, hematochezia, constipation, V/D, frequency, urgency, vaginal bleeding/discharge, flank pain, and all other sxs at this time. No further complaints or concerns at this time.           Arrival mode: Personal vehicle      PCP: Alcon Maher MD        Past Medical History:  Past Medical History:   Diagnosis Date    Hypertension        Past Surgical History:  Past Surgical History:   Procedure Laterality Date    DILATION AND CURETTAGE OF UTERUS      TONSILLECTOMY           Family History:  History reviewed. No pertinent family history.    Social History:  Social History     Tobacco Use    Smoking status: Never Smoker    Smokeless tobacco: Never Used   Substance and Sexual Activity    Alcohol use: No    Drug use: Unknown    Sexual activity: Unknown        Review of Systems     Review of Systems   Constitutional: Negative for chills and fever.   HENT: Negative for sore throat.    Respiratory: Negative for shortness of breath.    Cardiovascular: Negative for chest pain.   Gastrointestinal: Positive for abdominal pain (R sided)  "and nausea. Negative for blood in stool, constipation, diarrhea and vomiting.   Genitourinary: Negative for dysuria, flank pain, frequency, hematuria, urgency, vaginal bleeding and vaginal discharge.   Musculoskeletal: Negative for back pain.   Skin: Negative for rash.   Neurological: Negative for weakness.   Hematological: Does not bruise/bleed easily.   All other systems reviewed and are negative.       Physical Exam     Initial Vitals [01/23/20 0022]   BP Pulse Resp Temp SpO2   121/74 84 18 98.1 °F (36.7 °C) 98 %      MAP       --          Physical Exam  Nursing Notes and Vital Signs Reviewed.  Constitutional: Patient is in no acute distress. Well-developed and well-nourished.  Head: Atraumatic. Normocephalic.  Eyes: PERRL. EOM intact. Conjunctivae are not pale. No scleral icterus.  ENT: Mucous membranes are moist. Oropharynx is clear and symmetric.    Neck: Supple. Full ROM. No lymphadenopathy.  Cardiovascular: Regular rate. Regular rhythm. No murmurs, rubs, or gallops. Distal pulses are 2+ and symmetric.  Pulmonary/Chest: No respiratory distress. Clear to auscultation bilaterally. No wheezing or rales.  Abdominal: Soft and non-distended.  There is R-sided abd tenderness.  No rebound, guarding, or rigidity. Good bowel sounds.  Genitourinary: No CVA tenderness  Musculoskeletal: Moves all extremities. No obvious deformities. No edema. No calf tenderness.  Skin: Warm and dry.  Neurological:  Alert, awake, and appropriate.  Normal speech.  No acute focal neurological deficits are appreciated.  Psychiatric: Normal affect. Good eye contact. Appropriate in content.     ED Course   Procedures  ED Vital Signs:  Vitals:    01/23/20 0022 01/23/20 0209   BP: 121/74 116/65   Pulse: 84 79   Resp: 18 18   Temp: 98.1 °F (36.7 °C)    TempSrc: Oral    SpO2: 98% 99%   Weight: 79.7 kg (175 lb 11.3 oz)    Height: 5' 5" (1.651 m)        Abnormal Lab Results:  Labs Reviewed   CBC W/ AUTO DIFFERENTIAL - Abnormal; Notable for the " following components:       Result Value    Hemoglobin 11.8 (*)     Hematocrit 36.7 (*)     Mono # 1.1 (*)     All other components within normal limits   COMPREHENSIVE METABOLIC PANEL - Abnormal; Notable for the following components:    Potassium 3.3 (*)     All other components within normal limits   LIPASE - Abnormal; Notable for the following components:    Lipase 196 (*)     All other components within normal limits   URINALYSIS, REFLEX TO URINE CULTURE - Abnormal; Notable for the following components:    Specific Gravity, UA >=1.030 (*)     Occult Blood UA 2+ (*)     All other components within normal limits    Narrative:     Preferred Collection Type->Urine, Clean Catch   PREGNANCY TEST, URINE RAPID   URINALYSIS MICROSCOPIC    Narrative:     Preferred Collection Type->Urine, Clean Catch        All Lab Results:  Results for orders placed or performed during the hospital encounter of 01/23/20   CBC W/ AUTO DIFFERENTIAL   Result Value Ref Range    WBC 7.51 3.90 - 12.70 K/uL    RBC 4.24 4.00 - 5.40 M/uL    Hemoglobin 11.8 (L) 12.0 - 16.0 g/dL    Hematocrit 36.7 (L) 37.0 - 48.5 %    Mean Corpuscular Volume 87 82 - 98 fL    Mean Corpuscular Hemoglobin 27.8 27.0 - 31.0 pg    Mean Corpuscular Hemoglobin Conc 32.2 32.0 - 36.0 g/dL    RDW 13.2 11.5 - 14.5 %    Platelets 206 150 - 350 K/uL    MPV 10.7 9.2 - 12.9 fL    Immature Granulocytes 0.5 0.0 - 0.5 %    Gran # (ANC) 4.4 1.8 - 7.7 K/uL    Immature Grans (Abs) 0.04 0.00 - 0.04 K/uL    Lymph # 1.9 1.0 - 4.8 K/uL    Mono # 1.1 (H) 0.3 - 1.0 K/uL    Eos # 0.0 0.0 - 0.5 K/uL    Baso # 0.03 0.00 - 0.20 K/uL    nRBC 0 0 /100 WBC    Gran% 58.4 38.0 - 73.0 %    Lymph% 25.7 18.0 - 48.0 %    Mono% 14.5 4.0 - 15.0 %    Eosinophil% 0.5 0.0 - 8.0 %    Basophil% 0.4 0.0 - 1.9 %    Differential Method Automated    Comp. Metabolic Panel   Result Value Ref Range    Sodium 140 136 - 145 mmol/L    Potassium 3.3 (L) 3.5 - 5.1 mmol/L    Chloride 102 95 - 110 mmol/L    CO2 25 23 - 29  mmol/L    Glucose 100 70 - 110 mg/dL    BUN, Bld 19 6 - 20 mg/dL    Creatinine 0.9 0.5 - 1.4 mg/dL    Calcium 9.2 8.7 - 10.5 mg/dL    Total Protein 7.7 6.0 - 8.4 g/dL    Albumin 3.6 3.5 - 5.2 g/dL    Total Bilirubin 0.4 0.1 - 1.0 mg/dL    Alkaline Phosphatase 71 55 - 135 U/L    AST 15 10 - 40 U/L    ALT 15 10 - 44 U/L    Anion Gap 13 8 - 16 mmol/L    eGFR if African American >60 >60 mL/min/1.73 m^2    eGFR if non African American >60 >60 mL/min/1.73 m^2   Lipase   Result Value Ref Range    Lipase 196 (H) 4 - 60 U/L   Urinalysis, Reflex to Urine Culture Urine, Clean Catch   Result Value Ref Range    Specimen UA Urine, Clean Catch     Color, UA Yellow Yellow, Straw, Iva    Appearance, UA Clear Clear    pH, UA 6.0 5.0 - 8.0    Specific Gravity, UA >=1.030 (A) 1.005 - 1.030    Protein, UA Negative Negative    Glucose, UA Negative Negative    Ketones, UA Negative Negative    Bilirubin (UA) Negative Negative    Occult Blood UA 2+ (A) Negative    Nitrite, UA Negative Negative    Urobilinogen, UA Negative <2.0 EU/dL    Leukocytes, UA Negative Negative   Pregnancy, urine rapid   Result Value Ref Range    Preg Test, Ur Negative    Urinalysis Microscopic   Result Value Ref Range    RBC, UA 2 0 - 4 /hpf    WBC, UA 3 0 - 5 /hpf    Microscopic Comment SEE COMMENT          Imaging Results:  Imaging Results    None                 The Emergency Provider reviewed the vital signs and test results, which are outlined above.     ED Discussion       2:10 AM: Reassessed pt at this time.  Pt states her condition has improved at this time. Discussed with pt all pertinent ED information and results. Discussed pt dx and plan of tx. Gave pt all f/u and return to the ED instructions. All questions and concerns were addressed at this time. Pt expresses understanding of information and instructions, and is comfortable with plan to discharge. Pt is stable for discharge.    I discussed with patient and/or family/caretaker that evaluation in the  ED does not suggest any emergent or life threatening medical conditions requiring immediate intervention beyond what was provided in the ED, and I believe patient is safe for discharge.  Regardless, an unremarkable evaluation in the ED does not preclude the development or presence of a serious of life threatening condition. As such, patient was instructed to return immediately for any worsening or change in current symptoms.         Medical Decision Making:   Clinical Tests:   Lab Tests: Ordered and Reviewed           ED Medication(s):  Medications - No data to display    Discharge Medication List as of 1/23/2020  2:02 AM          Follow-up Information     Alcon Maher MD In 1 day.    Specialty:  Pediatrics  Contact information:  [address] [place] [address]  [phone]             [facility]    Specialty:  Emergency Medicine  Why:  As needed, If symptoms worsen  Contact information:  [address] [facility] [address] [phone]                     Scribe Attestation:   Scribe #1: I performed the above scribed service and the documentation accurately describes the services I performed. I attest to the accuracy of the note.     Attending:   Physician Attestation Statement for Scribe #1: I, Hattie Mcclellan MD, personally performed the services described in this documentation, as scribed by Aurea Patel, in my presence, and it is both accurate and complete.           Clinical Impression       ICD-10-CM ICD-9-CM   1. Right lower quadrant abdominal pain R10.31 789.03       Disposition:   Disposition: Discharged  Condition: Stable         Hattie Mcclellan MD  01/23/20 2012

## 2021-01-06 ENCOUNTER — HOSPITAL ENCOUNTER (EMERGENCY)
Facility: HOSPITAL | Age: 36
Discharge: HOME | End: 2021-01-06
Attending: EMERGENCY MEDICINE
Payer: COMMERCIAL

## 2021-01-06 VITALS
DIASTOLIC BLOOD PRESSURE: 97 MMHG | SYSTOLIC BLOOD PRESSURE: 134 MMHG | OXYGEN SATURATION: 99 % | WEIGHT: 191.5 LBS | HEIGHT: 65 IN | HEART RATE: 70 BPM | TEMPERATURE: 99 F | RESPIRATION RATE: 17 BRPM | BODY MASS INDEX: 31.9 KG/M2

## 2021-01-06 DIAGNOSIS — R10.10 UPPER ABDOMINAL PAIN: ICD-10-CM

## 2021-01-06 LAB
ALBUMIN SERPL BCP-MCNC: 3.8 G/DL (ref 3.5–5.2)
ALP SERPL-CCNC: 92 U/L (ref 55–135)
ALT SERPL W/O P-5'-P-CCNC: 20 U/L (ref 10–44)
AMPHET+METHAMPHET UR QL: NEGATIVE
ANION GAP SERPL CALC-SCNC: 9 MMOL/L (ref 8–16)
AST SERPL-CCNC: 18 U/L (ref 10–40)
B-HCG UR QL: NEGATIVE
BARBITURATES UR QL SCN>200 NG/ML: NEGATIVE
BASOPHILS # BLD AUTO: 0.03 K/UL (ref 0–0.2)
BASOPHILS NFR BLD: 0.4 % (ref 0–1.9)
BENZODIAZ UR QL SCN>200 NG/ML: NEGATIVE
BILIRUB SERPL-MCNC: 0.4 MG/DL (ref 0.1–1)
BILIRUB UR QL STRIP: NEGATIVE
BUN SERPL-MCNC: 11 MG/DL (ref 6–20)
BZE UR QL SCN: NEGATIVE
CALCIUM SERPL-MCNC: 9.6 MG/DL (ref 8.7–10.5)
CANNABINOIDS UR QL SCN: NEGATIVE
CHLORIDE SERPL-SCNC: 104 MMOL/L (ref 95–110)
CLARITY UR: CLEAR
CO2 SERPL-SCNC: 24 MMOL/L (ref 23–29)
COLOR UR: YELLOW
CREAT SERPL-MCNC: 0.8 MG/DL (ref 0.5–1.4)
CREAT UR-MCNC: 27.5 MG/DL (ref 15–325)
DIFFERENTIAL METHOD: ABNORMAL
EOSINOPHIL # BLD AUTO: 0 K/UL (ref 0–0.5)
EOSINOPHIL NFR BLD: 0.6 % (ref 0–8)
ERYTHROCYTE [DISTWIDTH] IN BLOOD BY AUTOMATED COUNT: 13.3 % (ref 11.5–14.5)
EST. GFR  (AFRICAN AMERICAN): >60 ML/MIN/1.73 M^2
EST. GFR  (NON AFRICAN AMERICAN): >60 ML/MIN/1.73 M^2
GLUCOSE SERPL-MCNC: 111 MG/DL (ref 70–110)
GLUCOSE UR QL STRIP: NEGATIVE
HCT VFR BLD AUTO: 39.9 % (ref 37–48.5)
HCV AB SERPL QL IA: POSITIVE
HGB BLD-MCNC: 12.7 G/DL (ref 12–16)
HGB UR QL STRIP: NEGATIVE
HIV 1+2 AB+HIV1 P24 AG SERPL QL IA: NEGATIVE
IMM GRANULOCYTES # BLD AUTO: 0.04 K/UL (ref 0–0.04)
IMM GRANULOCYTES NFR BLD AUTO: 0.6 % (ref 0–0.5)
KETONES UR QL STRIP: NEGATIVE
LEUKOCYTE ESTERASE UR QL STRIP: NEGATIVE
LIPASE SERPL-CCNC: 25 U/L (ref 4–60)
LYMPHOCYTES # BLD AUTO: 2.1 K/UL (ref 1–4.8)
LYMPHOCYTES NFR BLD: 29.8 % (ref 18–48)
MCH RBC QN AUTO: 27 PG (ref 27–31)
MCHC RBC AUTO-ENTMCNC: 31.8 G/DL (ref 32–36)
MCV RBC AUTO: 85 FL (ref 82–98)
METHADONE UR QL SCN>300 NG/ML: NEGATIVE
MONOCYTES # BLD AUTO: 0.8 K/UL (ref 0.3–1)
MONOCYTES NFR BLD: 11.8 % (ref 4–15)
NEUTROPHILS # BLD AUTO: 4 K/UL (ref 1.8–7.7)
NEUTROPHILS NFR BLD: 56.8 % (ref 38–73)
NITRITE UR QL STRIP: NEGATIVE
NRBC BLD-RTO: 0 /100 WBC
OPIATES UR QL SCN: NEGATIVE
PCP UR QL SCN>25 NG/ML: NEGATIVE
PH UR STRIP: 6 [PH] (ref 5–8)
PLATELET # BLD AUTO: 225 K/UL (ref 150–350)
PMV BLD AUTO: 10.8 FL (ref 9.2–12.9)
POTASSIUM SERPL-SCNC: 3.5 MMOL/L (ref 3.5–5.1)
PROT SERPL-MCNC: 8.1 G/DL (ref 6–8.4)
PROT UR QL STRIP: NEGATIVE
RBC # BLD AUTO: 4.7 M/UL (ref 4–5.4)
SODIUM SERPL-SCNC: 137 MMOL/L (ref 136–145)
SP GR UR STRIP: 1.01 (ref 1–1.03)
TOXICOLOGY INFORMATION: NORMAL
TROPONIN I SERPL DL<=0.01 NG/ML-MCNC: <0.006 NG/ML (ref 0–0.03)
URN SPEC COLLECT METH UR: NORMAL
UROBILINOGEN UR STRIP-ACNC: NEGATIVE EU/DL
WBC # BLD AUTO: 7.05 K/UL (ref 3.9–12.7)

## 2021-01-06 PROCEDURE — 81025 URINE PREGNANCY TEST: CPT

## 2021-01-06 PROCEDURE — 36415 COLL VENOUS BLD VENIPUNCTURE: CPT

## 2021-01-06 PROCEDURE — 85025 COMPLETE CBC W/AUTO DIFF WBC: CPT

## 2021-01-06 PROCEDURE — 99285 EMERGENCY DEPT VISIT HI MDM: CPT | Mod: 25

## 2021-01-06 PROCEDURE — 93005 ELECTROCARDIOGRAM TRACING: CPT

## 2021-01-06 PROCEDURE — 86703 HIV-1/HIV-2 1 RESULT ANTBDY: CPT

## 2021-01-06 PROCEDURE — 81003 URINALYSIS AUTO W/O SCOPE: CPT | Mod: 59

## 2021-01-06 PROCEDURE — 87522 HEPATITIS C REVRS TRNSCRPJ: CPT

## 2021-01-06 PROCEDURE — 86803 HEPATITIS C AB TEST: CPT

## 2021-01-06 PROCEDURE — 93010 EKG 12-LEAD: ICD-10-PCS | Mod: ,,, | Performed by: INTERNAL MEDICINE

## 2021-01-06 PROCEDURE — 80053 COMPREHEN METABOLIC PANEL: CPT

## 2021-01-06 PROCEDURE — 84484 ASSAY OF TROPONIN QUANT: CPT

## 2021-01-06 PROCEDURE — 80307 DRUG TEST PRSMV CHEM ANLYZR: CPT

## 2021-01-06 PROCEDURE — 93010 ELECTROCARDIOGRAM REPORT: CPT | Mod: ,,, | Performed by: INTERNAL MEDICINE

## 2021-01-06 PROCEDURE — 83690 ASSAY OF LIPASE: CPT

## 2021-01-06 RX ORDER — PANTOPRAZOLE SODIUM 40 MG/1
40 TABLET, DELAYED RELEASE ORAL
COMMUNITY
Start: 2020-12-04 | End: 2021-01-06 | Stop reason: CLARIF

## 2021-01-08 LAB
HCV RNA SERPL NAA+PROBE-LOG IU: <1.08 LOG (10) IU/ML
HCV RNA SERPL QL NAA+PROBE: NOT DETECTED IU/ML
HCV RNA SPEC NAA+PROBE-ACNC: <12 IU/ML

## 2021-03-03 ENCOUNTER — OFFICE VISIT (OUTPATIENT)
Dept: INTERNAL MEDICINE | Facility: CLINIC | Age: 36
End: 2021-03-03
Payer: COMMERCIAL

## 2021-03-03 VITALS
DIASTOLIC BLOOD PRESSURE: 76 MMHG | OXYGEN SATURATION: 98 % | WEIGHT: 189.81 LBS | TEMPERATURE: 100 F | SYSTOLIC BLOOD PRESSURE: 118 MMHG | BODY MASS INDEX: 31.63 KG/M2 | HEIGHT: 65 IN | HEART RATE: 99 BPM

## 2021-03-03 DIAGNOSIS — E66.9 OBESITY (BMI 30.0-34.9): ICD-10-CM

## 2021-03-03 DIAGNOSIS — R10.9 CHRONIC ABDOMINAL PAIN: ICD-10-CM

## 2021-03-03 DIAGNOSIS — R73.9 BLOOD GLUCOSE ELEVATED: ICD-10-CM

## 2021-03-03 DIAGNOSIS — G89.29 CHRONIC ABDOMINAL PAIN: ICD-10-CM

## 2021-03-03 DIAGNOSIS — I10 ESSENTIAL HYPERTENSION: Primary | ICD-10-CM

## 2021-03-03 DIAGNOSIS — G47.00 INSOMNIA, UNSPECIFIED TYPE: ICD-10-CM

## 2021-03-03 PROBLEM — E66.811 OBESITY (BMI 30.0-34.9): Status: ACTIVE | Noted: 2021-03-03

## 2021-03-03 PROCEDURE — 3008F BODY MASS INDEX DOCD: CPT | Mod: CPTII,S$GLB,, | Performed by: INTERNAL MEDICINE

## 2021-03-03 PROCEDURE — 99999 PR PBB SHADOW E&M-EST. PATIENT-LVL III: ICD-10-PCS | Mod: PBBFAC,,, | Performed by: INTERNAL MEDICINE

## 2021-03-03 PROCEDURE — 3078F DIAST BP <80 MM HG: CPT | Mod: CPTII,S$GLB,, | Performed by: INTERNAL MEDICINE

## 2021-03-03 PROCEDURE — 99999 PR PBB SHADOW E&M-EST. PATIENT-LVL III: CPT | Mod: PBBFAC,,, | Performed by: INTERNAL MEDICINE

## 2021-03-03 PROCEDURE — 3074F SYST BP LT 130 MM HG: CPT | Mod: CPTII,S$GLB,, | Performed by: INTERNAL MEDICINE

## 2021-03-03 PROCEDURE — 99204 PR OFFICE/OUTPT VISIT, NEW, LEVL IV, 45-59 MIN: ICD-10-PCS | Mod: S$GLB,,, | Performed by: INTERNAL MEDICINE

## 2021-03-03 PROCEDURE — 1125F AMNT PAIN NOTED PAIN PRSNT: CPT | Mod: S$GLB,,, | Performed by: INTERNAL MEDICINE

## 2021-03-03 PROCEDURE — 3074F PR MOST RECENT SYSTOLIC BLOOD PRESSURE < 130 MM HG: ICD-10-PCS | Mod: CPTII,S$GLB,, | Performed by: INTERNAL MEDICINE

## 2021-03-03 PROCEDURE — 3008F PR BODY MASS INDEX (BMI) DOCUMENTED: ICD-10-PCS | Mod: CPTII,S$GLB,, | Performed by: INTERNAL MEDICINE

## 2021-03-03 PROCEDURE — 3078F PR MOST RECENT DIASTOLIC BLOOD PRESSURE < 80 MM HG: ICD-10-PCS | Mod: CPTII,S$GLB,, | Performed by: INTERNAL MEDICINE

## 2021-03-03 PROCEDURE — 1125F PR PAIN SEVERITY QUANTIFIED, PAIN PRESENT: ICD-10-PCS | Mod: S$GLB,,, | Performed by: INTERNAL MEDICINE

## 2021-03-03 PROCEDURE — 99204 OFFICE O/P NEW MOD 45 MIN: CPT | Mod: S$GLB,,, | Performed by: INTERNAL MEDICINE

## 2021-03-03 RX ORDER — BUTALBITAL, ACETAMINOPHEN, CAFFEINE AND CODEINE PHOSPHATE 50; 325; 40; 30 MG/1; MG/1; MG/1; MG/1
CAPSULE ORAL EVERY 4 HOURS
COMMUNITY
End: 2022-03-03

## 2021-03-03 RX ORDER — CLOTRIMAZOLE AND BETAMETHASONE DIPROPIONATE 10; .64 MG/G; MG/G
CREAM TOPICAL 2 TIMES DAILY
COMMUNITY
Start: 2021-01-19 | End: 2022-03-03

## 2021-03-03 RX ORDER — PROPRANOLOL HYDROCHLORIDE 60 MG/1
60 CAPSULE, EXTENDED RELEASE ORAL DAILY
COMMUNITY
Start: 2021-01-19 | End: 2023-09-19

## 2021-03-03 RX ORDER — ZOLPIDEM TARTRATE 5 MG/1
5 TABLET ORAL NIGHTLY PRN
COMMUNITY
Start: 2021-01-19 | End: 2023-09-19

## 2021-03-03 RX ORDER — PANTOPRAZOLE SODIUM 40 MG/1
40 TABLET, DELAYED RELEASE ORAL EVERY MORNING
COMMUNITY
Start: 2021-01-19 | End: 2022-03-03

## 2021-03-17 ENCOUNTER — PATIENT OUTREACH (OUTPATIENT)
Dept: ADMINISTRATIVE | Facility: HOSPITAL | Age: 36
End: 2021-03-17

## 2021-03-19 ENCOUNTER — LAB VISIT (OUTPATIENT)
Dept: LAB | Facility: HOSPITAL | Age: 36
End: 2021-03-19
Attending: INTERNAL MEDICINE
Payer: COMMERCIAL

## 2021-03-19 DIAGNOSIS — R73.9 BLOOD GLUCOSE ELEVATED: ICD-10-CM

## 2021-03-19 DIAGNOSIS — I10 ESSENTIAL HYPERTENSION: ICD-10-CM

## 2021-03-19 LAB
ALBUMIN SERPL BCP-MCNC: 3.4 G/DL (ref 3.5–5.2)
ALP SERPL-CCNC: 74 U/L (ref 55–135)
ALT SERPL W/O P-5'-P-CCNC: 13 U/L (ref 10–44)
ANION GAP SERPL CALC-SCNC: 8 MMOL/L (ref 8–16)
AST SERPL-CCNC: 14 U/L (ref 10–40)
BILIRUB SERPL-MCNC: 0.5 MG/DL (ref 0.1–1)
BUN SERPL-MCNC: 14 MG/DL (ref 6–20)
CALCIUM SERPL-MCNC: 9 MG/DL (ref 8.7–10.5)
CHLORIDE SERPL-SCNC: 108 MMOL/L (ref 95–110)
CHOLEST SERPL-MCNC: 166 MG/DL (ref 120–199)
CHOLEST/HDLC SERPL: 4 {RATIO} (ref 2–5)
CO2 SERPL-SCNC: 24 MMOL/L (ref 23–29)
CREAT SERPL-MCNC: 0.8 MG/DL (ref 0.5–1.4)
EST. GFR  (AFRICAN AMERICAN): >60 ML/MIN/1.73 M^2
EST. GFR  (NON AFRICAN AMERICAN): >60 ML/MIN/1.73 M^2
ESTIMATED AVG GLUCOSE: 100 MG/DL (ref 68–131)
GLUCOSE SERPL-MCNC: 83 MG/DL (ref 70–110)
HBA1C MFR BLD: 5.1 % (ref 4–5.6)
HDLC SERPL-MCNC: 41 MG/DL (ref 40–75)
HDLC SERPL: 24.7 % (ref 20–50)
LDLC SERPL CALC-MCNC: 113.4 MG/DL (ref 63–159)
NONHDLC SERPL-MCNC: 125 MG/DL
POTASSIUM SERPL-SCNC: 4 MMOL/L (ref 3.5–5.1)
PROT SERPL-MCNC: 7.6 G/DL (ref 6–8.4)
SODIUM SERPL-SCNC: 140 MMOL/L (ref 136–145)
TRIGL SERPL-MCNC: 58 MG/DL (ref 30–150)
TSH SERPL DL<=0.005 MIU/L-ACNC: 1.89 UIU/ML (ref 0.4–4)

## 2021-03-19 PROCEDURE — 83036 HEMOGLOBIN GLYCOSYLATED A1C: CPT | Performed by: INTERNAL MEDICINE

## 2021-03-19 PROCEDURE — 36415 COLL VENOUS BLD VENIPUNCTURE: CPT | Performed by: INTERNAL MEDICINE

## 2021-03-19 PROCEDURE — 84443 ASSAY THYROID STIM HORMONE: CPT | Performed by: INTERNAL MEDICINE

## 2021-03-19 PROCEDURE — 80061 LIPID PANEL: CPT | Performed by: INTERNAL MEDICINE

## 2021-03-19 PROCEDURE — 85025 COMPLETE CBC W/AUTO DIFF WBC: CPT | Performed by: INTERNAL MEDICINE

## 2021-03-19 PROCEDURE — 80053 COMPREHEN METABOLIC PANEL: CPT | Performed by: INTERNAL MEDICINE

## 2021-03-20 LAB
ANISOCYTOSIS BLD QL SMEAR: SLIGHT
BASOPHILS # BLD AUTO: 0.05 K/UL (ref 0–0.2)
BASOPHILS NFR BLD: 0.7 % (ref 0–1.9)
BURR CELLS BLD QL SMEAR: ABNORMAL
DIFFERENTIAL METHOD: ABNORMAL
EOSINOPHIL # BLD AUTO: 0 K/UL (ref 0–0.5)
EOSINOPHIL NFR BLD: 0.6 % (ref 0–8)
ERYTHROCYTE [DISTWIDTH] IN BLOOD BY AUTOMATED COUNT: 14.2 % (ref 11.5–14.5)
HCT VFR BLD AUTO: 39.2 % (ref 37–48.5)
HGB BLD-MCNC: 12 G/DL (ref 12–16)
IMM GRANULOCYTES # BLD AUTO: 0.03 K/UL (ref 0–0.04)
IMM GRANULOCYTES NFR BLD AUTO: 0.4 % (ref 0–0.5)
LYMPHOCYTES # BLD AUTO: 1.7 K/UL (ref 1–4.8)
LYMPHOCYTES NFR BLD: 24.7 % (ref 18–48)
MCH RBC QN AUTO: 27.6 PG (ref 27–31)
MCHC RBC AUTO-ENTMCNC: 30.6 G/DL (ref 32–36)
MCV RBC AUTO: 90 FL (ref 82–98)
MONOCYTES # BLD AUTO: 0.8 K/UL (ref 0.3–1)
MONOCYTES NFR BLD: 10.9 % (ref 4–15)
NEUTROPHILS # BLD AUTO: 4.4 K/UL (ref 1.8–7.7)
NEUTROPHILS NFR BLD: 62.7 % (ref 38–73)
NRBC BLD-RTO: 0 /100 WBC
OVALOCYTES BLD QL SMEAR: ABNORMAL
PLATELET # BLD AUTO: 243 K/UL (ref 150–350)
PLATELET BLD QL SMEAR: ABNORMAL
PMV BLD AUTO: 11.8 FL (ref 9.2–12.9)
POIKILOCYTOSIS BLD QL SMEAR: ABNORMAL
RBC # BLD AUTO: 4.34 M/UL (ref 4–5.4)
WBC # BLD AUTO: 6.96 K/UL (ref 3.9–12.7)

## 2021-04-28 ENCOUNTER — OFFICE VISIT (OUTPATIENT)
Dept: INTERNAL MEDICINE | Facility: CLINIC | Age: 36
End: 2021-04-28
Payer: COMMERCIAL

## 2021-04-28 VITALS
BODY MASS INDEX: 31.51 KG/M2 | DIASTOLIC BLOOD PRESSURE: 82 MMHG | WEIGHT: 189.13 LBS | HEIGHT: 65 IN | TEMPERATURE: 99 F | HEART RATE: 95 BPM | SYSTOLIC BLOOD PRESSURE: 122 MMHG | OXYGEN SATURATION: 99 %

## 2021-04-28 DIAGNOSIS — K59.09 CHRONIC CONSTIPATION: ICD-10-CM

## 2021-04-28 DIAGNOSIS — R51.9 PERSISTENT HEADACHES: Primary | ICD-10-CM

## 2021-04-28 DIAGNOSIS — R10.9 CHRONIC ABDOMINAL PAIN: ICD-10-CM

## 2021-04-28 DIAGNOSIS — G89.29 CHRONIC ABDOMINAL PAIN: ICD-10-CM

## 2021-04-28 PROCEDURE — 99213 OFFICE O/P EST LOW 20 MIN: CPT | Mod: S$GLB,,, | Performed by: INTERNAL MEDICINE

## 2021-04-28 PROCEDURE — 3008F BODY MASS INDEX DOCD: CPT | Mod: CPTII,S$GLB,, | Performed by: INTERNAL MEDICINE

## 2021-04-28 PROCEDURE — 99999 PR PBB SHADOW E&M-EST. PATIENT-LVL IV: CPT | Mod: PBBFAC,,, | Performed by: INTERNAL MEDICINE

## 2021-04-28 PROCEDURE — 99999 PR PBB SHADOW E&M-EST. PATIENT-LVL IV: ICD-10-PCS | Mod: PBBFAC,,, | Performed by: INTERNAL MEDICINE

## 2021-04-28 PROCEDURE — 3008F PR BODY MASS INDEX (BMI) DOCUMENTED: ICD-10-PCS | Mod: CPTII,S$GLB,, | Performed by: INTERNAL MEDICINE

## 2021-04-28 PROCEDURE — 1126F PR PAIN SEVERITY QUANTIFIED, NO PAIN PRESENT: ICD-10-PCS | Mod: S$GLB,,, | Performed by: INTERNAL MEDICINE

## 2021-04-28 PROCEDURE — 1126F AMNT PAIN NOTED NONE PRSNT: CPT | Mod: S$GLB,,, | Performed by: INTERNAL MEDICINE

## 2021-04-28 PROCEDURE — 99213 PR OFFICE/OUTPT VISIT, EST, LEVL III, 20-29 MIN: ICD-10-PCS | Mod: S$GLB,,, | Performed by: INTERNAL MEDICINE

## 2021-04-28 RX ORDER — POLYETHYLENE GLYCOL 3350 17 G/17G
17 POWDER, FOR SOLUTION ORAL DAILY
COMMUNITY
Start: 2021-04-18 | End: 2022-03-14

## 2021-04-28 RX ORDER — TIMOLOL MALEATE 5 MG/ML
1 SOLUTION/ DROPS OPHTHALMIC DAILY
COMMUNITY
Start: 2021-04-22 | End: 2022-05-20

## 2021-04-28 RX ORDER — NYSTATIN AND TRIAMCINOLONE ACETONIDE 100000; 1 [USP'U]/G; MG/G
CREAM TOPICAL
COMMUNITY
Start: 2021-04-22 | End: 2022-03-14

## 2021-05-06 ENCOUNTER — HOSPITAL ENCOUNTER (EMERGENCY)
Facility: HOSPITAL | Age: 36
Discharge: HOME | End: 2021-05-07
Attending: EMERGENCY MEDICINE
Payer: COMMERCIAL

## 2021-05-06 ENCOUNTER — TELEPHONE (OUTPATIENT)
Dept: INTERNAL MEDICINE | Facility: CLINIC | Age: 36
End: 2021-05-06

## 2021-05-06 VITALS
SYSTOLIC BLOOD PRESSURE: 140 MMHG | OXYGEN SATURATION: 100 % | RESPIRATION RATE: 20 BRPM | HEIGHT: 65 IN | DIASTOLIC BLOOD PRESSURE: 88 MMHG | TEMPERATURE: 99 F | BODY MASS INDEX: 31.92 KG/M2 | WEIGHT: 191.56 LBS | HEART RATE: 80 BPM

## 2021-05-06 DIAGNOSIS — J06.9 VIRAL URI WITH COUGH: Primary | ICD-10-CM

## 2021-05-06 DIAGNOSIS — R07.89 CHEST WALL PAIN: ICD-10-CM

## 2021-05-06 PROCEDURE — 99284 EMERGENCY DEPT VISIT MOD MDM: CPT | Mod: 25

## 2021-05-07 LAB
CTP QC/QA: YES
SARS-COV-2 RDRP RESP QL NAA+PROBE: NEGATIVE

## 2021-05-07 PROCEDURE — U0002 COVID-19 LAB TEST NON-CDC: HCPCS | Performed by: NURSE PRACTITIONER

## 2021-05-07 PROCEDURE — 93010 EKG 12-LEAD: ICD-10-PCS | Mod: ,,, | Performed by: INTERNAL MEDICINE

## 2021-05-07 PROCEDURE — 93005 ELECTROCARDIOGRAM TRACING: CPT

## 2021-05-07 PROCEDURE — 93010 ELECTROCARDIOGRAM REPORT: CPT | Mod: ,,, | Performed by: INTERNAL MEDICINE

## 2021-05-07 RX ORDER — PROMETHAZINE HYDROCHLORIDE AND DEXTROMETHORPHAN HYDROBROMIDE 6.25; 15 MG/5ML; MG/5ML
5 SYRUP ORAL 3 TIMES DAILY PRN
Qty: 180 ML | Refills: 0 | Status: SHIPPED | OUTPATIENT
Start: 2021-05-07 | End: 2021-05-17

## 2021-05-07 RX ORDER — FLUTICASONE PROPIONATE 50 MCG
1 SPRAY, SUSPENSION (ML) NASAL 2 TIMES DAILY PRN
Qty: 15 G | Refills: 0 | Status: SHIPPED | OUTPATIENT
Start: 2021-05-07 | End: 2022-03-14

## 2021-05-07 RX ORDER — OXYMETAZOLINE HCL 0.05 %
1 SPRAY, NON-AEROSOL (ML) NASAL 2 TIMES DAILY
Qty: 15 ML | Refills: 0 | Status: SHIPPED | OUTPATIENT
Start: 2021-05-07 | End: 2021-05-10

## 2021-05-07 RX ORDER — METHYLPREDNISOLONE 4 MG/1
TABLET ORAL
Qty: 1 PACKAGE | Refills: 0 | Status: SHIPPED | OUTPATIENT
Start: 2021-05-07 | End: 2021-05-26 | Stop reason: ALTCHOICE

## 2021-05-07 RX ORDER — DICLOFENAC SODIUM 50 MG/1
50 TABLET, DELAYED RELEASE ORAL 3 TIMES DAILY PRN
Qty: 15 TABLET | Refills: 0 | Status: SHIPPED | OUTPATIENT
Start: 2021-05-07 | End: 2022-03-03

## 2021-05-26 ENCOUNTER — OFFICE VISIT (OUTPATIENT)
Dept: INTERNAL MEDICINE | Facility: CLINIC | Age: 36
End: 2021-05-26
Payer: COMMERCIAL

## 2021-05-26 VITALS
OXYGEN SATURATION: 98 % | DIASTOLIC BLOOD PRESSURE: 82 MMHG | HEART RATE: 88 BPM | RESPIRATION RATE: 17 BRPM | HEIGHT: 65 IN | TEMPERATURE: 98 F | WEIGHT: 190.5 LBS | SYSTOLIC BLOOD PRESSURE: 136 MMHG | BODY MASS INDEX: 31.74 KG/M2

## 2021-05-26 DIAGNOSIS — L30.9 DERMATITIS: Primary | ICD-10-CM

## 2021-05-26 PROCEDURE — 3008F PR BODY MASS INDEX (BMI) DOCUMENTED: ICD-10-PCS | Mod: CPTII,S$GLB,, | Performed by: INTERNAL MEDICINE

## 2021-05-26 PROCEDURE — 99213 OFFICE O/P EST LOW 20 MIN: CPT | Mod: S$GLB,,, | Performed by: INTERNAL MEDICINE

## 2021-05-26 PROCEDURE — 99213 PR OFFICE/OUTPT VISIT, EST, LEVL III, 20-29 MIN: ICD-10-PCS | Mod: S$GLB,,, | Performed by: INTERNAL MEDICINE

## 2021-05-26 PROCEDURE — 99999 PR PBB SHADOW E&M-EST. PATIENT-LVL V: CPT | Mod: PBBFAC,,, | Performed by: INTERNAL MEDICINE

## 2021-05-26 PROCEDURE — 99999 PR PBB SHADOW E&M-EST. PATIENT-LVL V: ICD-10-PCS | Mod: PBBFAC,,, | Performed by: INTERNAL MEDICINE

## 2021-05-26 PROCEDURE — 3008F BODY MASS INDEX DOCD: CPT | Mod: CPTII,S$GLB,, | Performed by: INTERNAL MEDICINE

## 2021-05-26 PROCEDURE — 1126F AMNT PAIN NOTED NONE PRSNT: CPT | Mod: S$GLB,,, | Performed by: INTERNAL MEDICINE

## 2021-05-26 PROCEDURE — 1126F PR PAIN SEVERITY QUANTIFIED, NO PAIN PRESENT: ICD-10-PCS | Mod: S$GLB,,, | Performed by: INTERNAL MEDICINE

## 2021-05-26 RX ORDER — PREDNISONE 20 MG/1
TABLET ORAL
Qty: 11 TABLET | Refills: 0 | Status: SHIPPED | OUTPATIENT
Start: 2021-05-26 | End: 2022-03-14

## 2021-11-30 ENCOUNTER — OFFICE VISIT (OUTPATIENT)
Dept: INTERNAL MEDICINE | Facility: CLINIC | Age: 36
End: 2021-11-30
Payer: COMMERCIAL

## 2021-11-30 VITALS
OXYGEN SATURATION: 98 % | SYSTOLIC BLOOD PRESSURE: 148 MMHG | DIASTOLIC BLOOD PRESSURE: 108 MMHG | TEMPERATURE: 98 F | WEIGHT: 188.19 LBS | HEIGHT: 65 IN | HEART RATE: 81 BPM | BODY MASS INDEX: 31.35 KG/M2

## 2021-11-30 DIAGNOSIS — I10 ESSENTIAL HYPERTENSION: Primary | ICD-10-CM

## 2021-11-30 DIAGNOSIS — E66.9 OBESITY (BMI 30.0-34.9): ICD-10-CM

## 2021-11-30 PROCEDURE — 99213 OFFICE O/P EST LOW 20 MIN: CPT | Mod: S$GLB,,,

## 2021-11-30 PROCEDURE — 99999 PR PBB SHADOW E&M-EST. PATIENT-LVL III: ICD-10-PCS | Mod: PBBFAC,,,

## 2021-11-30 PROCEDURE — 99999 PR PBB SHADOW E&M-EST. PATIENT-LVL III: CPT | Mod: PBBFAC,,,

## 2021-11-30 PROCEDURE — 99213 PR OFFICE/OUTPT VISIT, EST, LEVL III, 20-29 MIN: ICD-10-PCS | Mod: S$GLB,,,

## 2021-11-30 RX ORDER — AMLODIPINE BESYLATE 5 MG/1
5 TABLET ORAL DAILY
Qty: 30 TABLET | Refills: 11 | Status: SHIPPED | OUTPATIENT
Start: 2021-11-30 | End: 2022-12-13

## 2021-12-14 ENCOUNTER — OFFICE VISIT (OUTPATIENT)
Dept: INTERNAL MEDICINE | Facility: CLINIC | Age: 36
End: 2021-12-14
Payer: COMMERCIAL

## 2021-12-14 VITALS
WEIGHT: 188.94 LBS | HEIGHT: 65 IN | BODY MASS INDEX: 31.48 KG/M2 | TEMPERATURE: 98 F | HEART RATE: 89 BPM | OXYGEN SATURATION: 98 % | SYSTOLIC BLOOD PRESSURE: 132 MMHG | DIASTOLIC BLOOD PRESSURE: 88 MMHG

## 2021-12-14 DIAGNOSIS — L20.9 ATOPIC DERMATITIS, UNSPECIFIED TYPE: ICD-10-CM

## 2021-12-14 DIAGNOSIS — M54.50 LUMBAR PAIN: ICD-10-CM
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
## 2021-12-14 DIAGNOSIS — I10 ESSENTIAL HYPERTENSION: ICD-10-CM

## 2021-12-14 DIAGNOSIS — E66.9 OBESITY (BMI 30.0-34.9): ICD-10-CM

## 2021-12-14 PROCEDURE — 99999 PR PBB SHADOW E&M-EST. PATIENT-LVL IV: ICD-10-PCS | Mod: PBBFAC,,,

## 2021-12-14 PROCEDURE — 99214 PR OFFICE/OUTPT VISIT, EST, LEVL IV, 30-39 MIN: ICD-10-PCS | Mod: S$GLB,,,

## 2021-12-14 PROCEDURE — 99999 PR PBB SHADOW E&M-EST. PATIENT-LVL IV: CPT | Mod: PBBFAC,,,

## 2021-12-14 PROCEDURE — 99214 OFFICE O/P EST MOD 30 MIN: CPT | Mod: S$GLB,,,

## 2021-12-14 RX ORDER — METHOCARBAMOL 500 MG/1
500 TABLET, FILM COATED ORAL 3 TIMES DAILY
Qty: 30 TABLET | Refills: 0 | Status: SHIPPED | OUTPATIENT
Start: 2021-12-14 | End: 2021-12-24

## 2021-12-14 RX ORDER — TRIAMCINOLONE ACETONIDE 1 MG/G
CREAM TOPICAL 2 TIMES DAILY
Qty: 80 G | Refills: 2 | Status: SHIPPED | OUTPATIENT
Start: 2021-12-14 | End: 2022-03-03

## 2021-12-14 RX ORDER — MELOXICAM 15 MG/1
15 TABLET ORAL DAILY
Qty: 30 TABLET | Refills: 1 | Status: SHIPPED | OUTPATIENT
Start: 2021-12-14 | End: 2022-03-03

## 2021-12-22 ENCOUNTER — HOSPITAL ENCOUNTER (EMERGENCY)
Facility: HOSPITAL | Age: 36
Discharge: HOME | End: 2021-12-22
Attending: EMERGENCY MEDICINE
Payer: COMMERCIAL

## 2021-12-22 VITALS
BODY MASS INDEX: 31.28 KG/M2 | HEART RATE: 96 BPM | TEMPERATURE: 101 F | OXYGEN SATURATION: 99 % | RESPIRATION RATE: 18 BRPM | WEIGHT: 187.94 LBS | DIASTOLIC BLOOD PRESSURE: 77 MMHG | SYSTOLIC BLOOD PRESSURE: 123 MMHG

## 2021-12-22 DIAGNOSIS — J10.1 INFLUENZA A: Primary | ICD-10-CM

## 2021-12-22 LAB
B-HCG UR QL: NEGATIVE
BACTERIA #/AREA URNS HPF: NORMAL /HPF
BILIRUB UR QL STRIP: NEGATIVE
CLARITY UR: CLEAR
COLOR UR: YELLOW
CTP QC/QA: YES
CTP QC/QA: YES
GLUCOSE UR QL STRIP: NEGATIVE
HGB UR QL STRIP: ABNORMAL
KETONES UR QL STRIP: NEGATIVE
LEUKOCYTE ESTERASE UR QL STRIP: ABNORMAL
MICROSCOPIC COMMENT: NORMAL
NITRITE UR QL STRIP: NEGATIVE
PH UR STRIP: 6 [PH] (ref 5–8)
POC MOLECULAR INFLUENZA A AGN: POSITIVE
POC MOLECULAR INFLUENZA B AGN: NEGATIVE
PROT UR QL STRIP: NEGATIVE
RBC #/AREA URNS HPF: 0 /HPF (ref 0–4)
SARS-COV-2 RDRP RESP QL NAA+PROBE: NEGATIVE
SP GR UR STRIP: 1.01 (ref 1–1.03)
SQUAMOUS #/AREA URNS HPF: 2 /HPF
URN SPEC COLLECT METH UR: ABNORMAL
UROBILINOGEN UR STRIP-ACNC: NEGATIVE EU/DL
WBC #/AREA URNS HPF: 5 /HPF (ref 0–5)

## 2021-12-22 PROCEDURE — 25000003 PHARM REV CODE 250: Performed by: NURSE PRACTITIONER

## 2021-12-22 PROCEDURE — 81000 URINALYSIS NONAUTO W/SCOPE: CPT | Performed by: NURSE PRACTITIONER

## 2021-12-22 PROCEDURE — U0002 COVID-19 LAB TEST NON-CDC: HCPCS | Performed by: NURSE PRACTITIONER

## 2021-12-22 PROCEDURE — 81025 URINE PREGNANCY TEST: CPT | Performed by: NURSE PRACTITIONER

## 2021-12-22 PROCEDURE — 99283 EMERGENCY DEPT VISIT LOW MDM: CPT | Mod: 25

## 2021-12-22 RX ORDER — ACETAMINOPHEN 325 MG/1
650 TABLET ORAL
Status: COMPLETED | OUTPATIENT
Start: 2021-12-22 | End: 2021-12-22

## 2021-12-22 RX ORDER — OSELTAMIVIR PHOSPHATE 75 MG/1
75 CAPSULE ORAL 2 TIMES DAILY
Qty: 10 CAPSULE | Refills: 0 | Status: SHIPPED | OUTPATIENT
Start: 2021-12-22 | End: 2021-12-27

## 2021-12-22 RX ORDER — IBUPROFEN 600 MG/1
600 TABLET ORAL
Status: COMPLETED | OUTPATIENT
Start: 2021-12-22 | End: 2021-12-22

## 2021-12-22 RX ADMIN — ACETAMINOPHEN 650 MG: 325 TABLET ORAL at 12:12

## 2021-12-22 RX ADMIN — IBUPROFEN 600 MG: 600 TABLET, FILM COATED ORAL at 02:12

## 2021-12-22 NOTE — ED PROVIDER NOTES
HISTORY     Chief Complaint   Patient presents with    Hypertension     Pt reports her BP being high this morning 152/88 and has generalized body aches, runny nose, a HA, and temp of 102.7     Review of patient's allergies indicates:   Allergen Reactions    Bactrim [sulfamethoxazole-trimethoprim] Swelling        HPI   The history is provided by the patient. No  was used.   General Illness   Associated symptoms include a fever, headaches, rhinorrhea and muscle aches. Pertinent negatives include no decreased vision, no double vision, no eye itching, no photophobia, no abdominal pain, no nausea, no vomiting, no congestion, no ear discharge, no ear pain, no hearing loss, no mouth sores, no sore throat, no stridor, no swollen glands, no cough, no shortness of breath, no rash, no discharge, no pain and no eye redness.        PCP: Sherrie Cristina DO     Past Medical History:  Past Medical History:   Diagnosis Date    Helicobacter pylori (H. pylori) infection     Hepatitis C antibody positive in blood 01/06/2021    RNA NEGATIVE    Hypertension     Migraines         Past Surgical History:  Past Surgical History:   Procedure Laterality Date    DILATION AND CURETTAGE OF UTERUS      TONSILLECTOMY          Family History:  Family History   Problem Relation Age of Onset    Hypertension Father     Hypertension Sister         Social History:  Social History     Tobacco Use    Smoking status: Never Smoker    Smokeless tobacco: Never Used   Substance and Sexual Activity    Alcohol use: No    Drug use: Never    Sexual activity: Yes         ROS   Review of Systems   Constitutional: Positive for fever.   HENT: Positive for rhinorrhea. Negative for congestion, ear discharge, ear pain, hearing loss, mouth sores and sore throat.    Eyes: Negative for double vision, photophobia, pain, discharge, redness and itching.   Respiratory: Negative for cough, shortness of breath and stridor.    Cardiovascular:  Negative for chest pain.   Gastrointestinal: Negative for abdominal pain, nausea and vomiting.   Genitourinary: Negative for dysuria.   Musculoskeletal: Negative for back pain.   Skin: Negative for rash.   Neurological: Positive for headaches. Negative for weakness.   Hematological: Does not bruise/bleed easily.       PHYSICAL EXAM     Initial Vitals [12/22/21 1152]   BP Pulse Resp Temp SpO2   (!) 141/87 (!) 119 20 (!) 102.4 °F (39.1 °C) 98 %      MAP       --           Physical Exam    Nursing note and vitals reviewed.  Constitutional: She appears well-developed and well-nourished. She is not diaphoretic. No distress.   HENT:   Head: Normocephalic and atraumatic.   Eyes: Right eye exhibits no discharge. Left eye exhibits no discharge.   Neck: Neck supple.   Normal range of motion.  Cardiovascular: Normal rate.   Pulmonary/Chest: No respiratory distress.   Abdominal: She exhibits no distension.   Musculoskeletal:         General: Normal range of motion.      Cervical back: Normal range of motion and neck supple.     Neurological: She is alert and oriented to person, place, and time. She has normal strength.   Skin: Skin is warm and dry.   Psychiatric: She has a normal mood and affect. Her behavior is normal. Thought content normal.          ED COURSE   Procedures  ED ONGOING VITALS:  Vitals:    12/22/21 1152 12/22/21 1334 12/22/21 1453   BP: (!) 141/87 122/72 123/77   Pulse: (!) 119 94 96   Resp: 20 18 18   Temp: (!) 102.4 °F (39.1 °C) (!) 101 °F (38.3 °C) (!) 101.4 °F (38.6 °C)   TempSrc: Oral Oral Oral   SpO2: 98% 99% 99%   Weight: 85.2 kg (187 lb 15.1 oz)           ABNORMAL LAB VALUES:  Labs Reviewed   URINALYSIS, REFLEX TO URINE CULTURE - Abnormal; Notable for the following components:       Result Value    Occult Blood UA Trace (*)     Leukocytes, UA 2+ (*)     All other components within normal limits    Narrative:     Specimen Source->Urine   POCT INFLUENZA A/B MOLECULAR - Abnormal; Notable for the following  components:    POC Molecular Influenza A Ag Positive (*)     All other components within normal limits   PREGNANCY TEST, URINE RAPID    Narrative:     Specimen Source->Urine   URINALYSIS MICROSCOPIC    Narrative:     Specimen Source->Urine   SARS-COV-2 RDRP GENE    Narrative:     This test utilizes isothermal nucleic acid amplification   technology to detect the SARS-CoV-2 RdRp nucleic acid segment.   The analytical sensitivity (limit of detection) is 125 genome   equivalents/mL.   A POSITIVE result implies infection with the SARS-CoV-2 virus;   the patient is presumed to be contagious.     A NEGATIVE result means that SARS-CoV-2 nucleic acids are not   present above the limit of detection. A NEGATIVE result should be   treated as presumptive. It does not rule out the possibility of   COVID-19 and should not be the sole basis for treatment decisions.   If COVID-19 is strongly suspected based on clinical and exposure   history, re-testing using an alternate molecular assay should be   considered.   This test is only for use under the Food and Drug   Administration s Emergency Use Authorization (EUA).   Commercial kits are provided by [employer].   Performance characteristics of the EUA have been independently   verified by [facility] Department of   Pathology and Laboratory Medicine.   _________________________________________________________________   The authorized Fact Sheet for Healthcare Providers and the authorized Fact   Sheet for Patients of the ID NOW COVID-19 are available on the FDA   website:     https://www.fda.gov/media/985087/download  https://www.fda.gov/media/850748/download               ALL LAB VALUES:  Results for orders placed or performed during the hospital encounter of 12/22/21   Urinalysis, Reflex to Urine Culture Urine, Clean Catch    Specimen: Urine   Result Value Ref Range    Specimen UA Urine, Clean Catch     Color, UA Yellow Yellow, Straw, Iva    Appearance, UA  Clear Clear    pH, UA 6.0 5.0 - 8.0    Specific Gravity, UA 1.010 1.005 - 1.030    Protein, UA Negative Negative    Glucose, UA Negative Negative    Ketones, UA Negative Negative    Bilirubin (UA) Negative Negative    Occult Blood UA Trace (A) Negative    Nitrite, UA Negative Negative    Urobilinogen, UA Negative <2.0 EU/dL    Leukocytes, UA 2+ (A) Negative   Pregnancy, urine rapid (UPT)   Result Value Ref Range    Preg Test, Ur Negative    Urinalysis Microscopic   Result Value Ref Range    RBC, UA 0 0 - 4 /hpf    WBC, UA 5 0 - 5 /hpf    Bacteria Occasional None-Occ /hpf    Squam Epithel, UA 2 /hpf    Microscopic Comment SEE COMMENT    POCT COVID-19 Rapid Screening   Result Value Ref Range    POC Rapid COVID Negative Negative     Acceptable Yes    POCT Influenza A/B Molecular   Result Value Ref Range    POC Molecular Influenza A Ag Positive (A) Negative, Not Reported    POC Molecular Influenza B Ag Negative Negative, Not Reported     Acceptable Yes            RADIOLOGY STUDIES:  Imaging Results    None                   The above vital signs and test results have been reviewed by the emergency provider.     ED Medications:  Medications   acetaminophen tablet 650 mg (650 mg Oral Given 12/22/21 1219)   ibuprofen tablet 600 mg (600 mg Oral Given 12/22/21 1428)       Current Discharge Medication List        Discharge Medications:  Discharge Medication List as of 12/22/2021  2:24 PM      START taking these medications    Details   oseltamivir (TAMIFLU) 75 MG capsule Take 1 capsule (75 mg total) by mouth 2 (two) times daily. for 5 days, Starting Wed 12/22/2021, Until Mon 12/27/2021, Print             Follow-up Information     Sherrie Cristina DO.    Specialty: Internal Medicine  Why: As needed  Contact information:  [address] [place] [address] [address] Omar MELENDREZ [id number]  [phone]             [facility] - Emergency Dept..    Specialty: Emergency Medicine  Why: If symptoms worsen  Contact  information:  [id number] [facility][1] [id number] [phone]                      2:25 PM    I discussed with patient and/or family/caretaker that evaluation in the ED does not suggest any emergent or life threatening medical conditions requiring immediate intervention beyond what was provided in the ED, and I believe patient is safe for discharge. Regardless, an unremarkable evaluation in the ED does not preclude the development or presence of a serious or life threatening condition. As such, patient was instructed to return immediately for any worsening or change in current symptoms.      MEDICAL DECISION MAKING                 CLINICAL IMPRESSION       ICD-10-CM ICD-9-CM   1. Influenza A  J10.1 487.1       Disposition:   Disposition: Discharged  Condition: Stable         Gregory Bennett NP  12/22/21 2409

## 2022-02-01 ENCOUNTER — TELEPHONE (OUTPATIENT)
Dept: INTERNAL MEDICINE | Facility: CLINIC | Age: 37
End: 2022-02-01
Payer: COMMERCIAL

## 2022-02-01 DIAGNOSIS — U07.1 COVID-19: Primary | ICD-10-CM

## 2022-02-01 RX ORDER — PROMETHAZINE HYDROCHLORIDE AND PHENYLEPHRINE HYDROCHLORIDE 6.25; 5 MG/5ML; MG/5ML
5 SYRUP ORAL EVERY 6 HOURS PRN
Qty: 118 ML | Refills: 0 | Status: SHIPPED | OUTPATIENT
Start: 2022-02-01 | End: 2022-02-11

## 2022-02-01 NOTE — TELEPHONE ENCOUNTER
----- Message from Elen Bedoya sent at 2/1/2022 11:11 AM CST -----  Contact: Ruma mejia [phone]  Patient would like to get medical advice.  Symptoms (please be specific):    How long have you had these symptoms:   Would you like a call back, or a response through your [facility] portal?:call back  Pharmacy name and phone # (copy from chart):    Comments: Pt is requesting a call back from the nurse because she tested positive for covid on yesterday and has a bad cough, so she would like something sent to the pharmacy for the cough

## 2022-02-01 NOTE — TELEPHONE ENCOUNTER
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 She states that she has a really bad cough and can't get much sleep cause of it. She is wanting to know if something could be called in for the cough. Please advise.

## 2022-03-09 ENCOUNTER — OFFICE VISIT (OUTPATIENT)
Dept: INTERNAL MEDICINE | Facility: CLINIC | Age: 37
End: 2022-03-09
Payer: COMMERCIAL

## 2022-03-09 VITALS
WEIGHT: 189.38 LBS | SYSTOLIC BLOOD PRESSURE: 126 MMHG | HEART RATE: 108 BPM | OXYGEN SATURATION: 98 % | HEIGHT: 65 IN | DIASTOLIC BLOOD PRESSURE: 84 MMHG | BODY MASS INDEX: 31.55 KG/M2 | TEMPERATURE: 99 F

## 2022-03-09 DIAGNOSIS — G89.29 CHRONIC ABDOMINAL PAIN: Primary | ICD-10-CM

## 2022-03-09 DIAGNOSIS — R10.84 GENERALIZED ABDOMINAL PAIN: ICD-10-CM

## 2022-03-09 DIAGNOSIS — R10.9 CHRONIC ABDOMINAL PAIN: Primary | ICD-10-CM

## 2022-03-09 DIAGNOSIS — I10 ESSENTIAL HYPERTENSION: ICD-10-CM

## 2022-03-09 DIAGNOSIS — E66.9 OBESITY (BMI 30.0-34.9): ICD-10-CM

## 2022-03-09 LAB
B-HCG UR QL: NEGATIVE
BACTERIA #/AREA URNS AUTO: ABNORMAL /HPF
BILIRUB UR QL STRIP: NEGATIVE
CLARITY UR REFRACT.AUTO: ABNORMAL
COLOR UR AUTO: YELLOW
GLUCOSE UR QL STRIP: NEGATIVE
HGB UR QL STRIP: NEGATIVE
KETONES UR QL STRIP: NEGATIVE
LEUKOCYTE ESTERASE UR QL STRIP: ABNORMAL
MICROSCOPIC COMMENT: ABNORMAL
NITRITE UR QL STRIP: NEGATIVE
PH UR STRIP: 6 [PH] (ref 5–8)
PROT UR QL STRIP: NEGATIVE
RBC #/AREA URNS AUTO: 7 /HPF (ref 0–4)
SP GR UR STRIP: 1.02 (ref 1–1.03)
SQUAMOUS #/AREA URNS AUTO: 62 /HPF
URN SPEC COLLECT METH UR: ABNORMAL
WBC #/AREA URNS AUTO: >100 /HPF (ref 0–5)
YEAST UR QL AUTO: ABNORMAL

## 2022-03-09 PROCEDURE — 3074F SYST BP LT 130 MM HG: CPT | Mod: CPTII,S$GLB,,

## 2022-03-09 PROCEDURE — 87086 URINE CULTURE/COLONY COUNT: CPT

## 2022-03-09 PROCEDURE — 3079F PR MOST RECENT DIASTOLIC BLOOD PRESSURE 80-89 MM HG: ICD-10-PCS | Mod: CPTII,S$GLB,,

## 2022-03-09 PROCEDURE — 99214 OFFICE O/P EST MOD 30 MIN: CPT | Mod: S$GLB,,,

## 2022-03-09 PROCEDURE — 1159F PR MEDICATION LIST DOCUMENTED IN MEDICAL RECORD: ICD-10-PCS | Mod: CPTII,S$GLB,,

## 2022-03-09 PROCEDURE — 81001 URINALYSIS AUTO W/SCOPE: CPT

## 2022-03-09 PROCEDURE — 3079F DIAST BP 80-89 MM HG: CPT | Mod: CPTII,S$GLB,,

## 2022-03-09 PROCEDURE — 3008F PR BODY MASS INDEX (BMI) DOCUMENTED: ICD-10-PCS | Mod: CPTII,S$GLB,,

## 2022-03-09 PROCEDURE — 1160F PR REVIEW ALL MEDS BY PRESCRIBER/CLIN PHARMACIST DOCUMENTED: ICD-10-PCS | Mod: CPTII,S$GLB,,

## 2022-03-09 PROCEDURE — 3074F PR MOST RECENT SYSTOLIC BLOOD PRESSURE < 130 MM HG: ICD-10-PCS | Mod: CPTII,S$GLB,,

## 2022-03-09 PROCEDURE — 81025 URINE PREGNANCY TEST: CPT

## 2022-03-09 PROCEDURE — 99214 PR OFFICE/OUTPT VISIT, EST, LEVL IV, 30-39 MIN: ICD-10-PCS | Mod: S$GLB,,,

## 2022-03-09 PROCEDURE — 3008F BODY MASS INDEX DOCD: CPT | Mod: CPTII,S$GLB,,

## 2022-03-09 PROCEDURE — 1159F MED LIST DOCD IN RCRD: CPT | Mod: CPTII,S$GLB,,

## 2022-03-09 PROCEDURE — 99999 PR PBB SHADOW E&M-EST. PATIENT-LVL IV: ICD-10-PCS | Mod: PBBFAC,,,

## 2022-03-09 PROCEDURE — 1160F RVW MEDS BY RX/DR IN RCRD: CPT | Mod: CPTII,S$GLB,,

## 2022-03-09 PROCEDURE — 99999 PR PBB SHADOW E&M-EST. PATIENT-LVL IV: CPT | Mod: PBBFAC,,,

## 2022-03-09 NOTE — PROGRESS NOTES
Ruma Keane  03/09/2022  [id number]    Sherrie Cristina DO  Patient Care Team:  Sherrie Cristina DO as PCP - General (Internal Medicine)  Corby Cowan MD as Obstetrician (Obstetrics and Gynecology)  Has the patient seen any provider outside of the [facility] network since the last visit? (no). If yes, HIPPA forms completed and records requested.        Visit Type:an urgent visit for an existing problem     Chief Complaint:  Chief Complaint   Patient presents with    Abdominal Pain       History of Present Illness:    36 year old female presents today for abdominal pain  She saw her GYN   Her pain was on the right side of her abdomen, thought it could be her Gallbladder that was causing the pain     She states that her pain is worse at night  She states that after she eats, she is still hungry  She does not get full  When she has intense pain  She gets a bad taste in her mouth at times  The pain wakes her up out of her sleep at times    [facility]   Had [facility]   Diagnosed with H pylori  Was treated with antibotics      History:  Past Medical History:   Diagnosis Date    Helicobacter pylori (H. pylori) infection     Hepatitis C antibody positive in blood 01/06/2021    RNA NEGATIVE    Hepatitis C virus infection cured after antiviral drug therapy 01/2022    Hypertension     Migraines      Past Surgical History:   Procedure Laterality Date    DILATION AND CURETTAGE OF UTERUS      TONSILLECTOMY       Family History   Problem Relation Age of Onset    Hypertension Father     Hypertension Sister      Social History     Socioeconomic History    Marital status: Single   Tobacco Use    Smoking status: Never Smoker    Smokeless tobacco: Never Used   Substance and Sexual Activity    Alcohol use: No    Drug use: Never    Sexual activity: Yes     Patient Active Problem List   Diagnosis    Essential hypertension    Obesity (BMI 30.0-34.9)     Review of patient's allergies indicates:    Allergen Reactions    Bactrim [sulfamethoxazole-trimethoprim] Swelling       The following were reviewed at this visit: active problem list, medication list, allergies, family history, social history, and health maintenance.    Medications:  Current Outpatient Medications on File Prior to Visit   Medication Sig Dispense Refill    amLODIPine (NORVASC) 5 MG tablet Take 1 tablet (5 mg total) by mouth once daily. 30 tablet 11    DULCOLAX, BISACODYL, ORAL Take by mouth.      fluticasone propionate (FLONASE) 50 mcg/actuation nasal spray 1 spray (50 mcg total) by Each Nostril route 2 (two) times daily as needed. (Patient not taking: Reported on 12/14/2021) 15 g 0    hydroCHLOROthiazide (HYDRODIURIL) 25 MG tablet Take 25 mg by mouth once daily.      nystatin-triamcinolone (MYCOLOG II) cream APPLY TOPICALLY TO THE AFFECTED AREA TWICE DAILY IN THE MORNING AND IN THE EVENING FOR 5 DAYS      polyethylene glycol (GLYCOLAX) 17 gram/dose powder Take 17 g by mouth once daily.      predniSONE (DELTASONE) 20 MG tablet One tablet BID x 3 days, one tablet daily x 3 days then 1/2 tablet daily x 4 days. 11 tablet 0    propranoloL (INDERAL LA) 60 MG 24 hr capsule Take 60 mg by mouth once daily.      sertraline (ZOLOFT) 25 MG tablet Take 25 mg by mouth once daily.      VIENVA 0.1-20 mg-mcg per tablet Take 1 tablet by mouth once daily.      zolpidem (AMBIEN) 5 MG Tab Take 5 mg by mouth nightly as needed.       No current facility-administered medications on file prior to visit.       Medications have been reviewed and reconciled with patient at this visit.  Barriers to medications reviewed with patient.    Adverse reactions to current medications reviewed with patient..    Over the counter medications reviewed and reconciled with patient.    Exam:  Wt Readings from Last 3 Encounters:   03/03/22 85.7 kg (189 lb)   12/22/21 85.2 kg (187 lb 15.1 oz)   12/14/21 85.7 kg (188 lb 15 oz)     Temp Readings from Last 3 Encounters:    12/22/21 (!) 101.4 °F (38.6 °C) (Oral)   12/14/21 98.3 °F (36.8 °C) (Tympanic)   11/30/21 98.4 °F (36.9 °C) (Tympanic)     BP Readings from Last 3 Encounters:   03/03/22 (!) 148/102   12/22/21 123/77   12/14/21 132/88     Pulse Readings from Last 3 Encounters:   12/22/21 96   12/14/21 89   11/30/21 81     There is no height or weight on file to calculate BMI.      Review of Systems   Constitutional: Negative.  Negative for chills and fever.   HENT: Negative.  Negative for congestion, sinus pain and sore throat.    Eyes: Negative for double vision.   Respiratory: Negative for cough, sputum production, shortness of breath and wheezing.    Cardiovascular: Negative for chest pain, palpitations and leg swelling.   Gastrointestinal: Positive for abdominal pain and heartburn. Negative for constipation, diarrhea, nausea and vomiting.   Genitourinary: Negative.    Musculoskeletal: Negative.    Skin: Negative.  Negative for rash.   Neurological: Negative.    Endo/Heme/Allergies: Negative.  Negative for polydipsia. Does not bruise/bleed easily.   Psychiatric/Behavioral: Negative for depression and substance abuse.     Physical Exam  Vitals and nursing note reviewed.   Constitutional:       General: She is not in acute distress.     Appearance: She is well-developed. She is obese. She is not diaphoretic.   HENT:      Head: Normocephalic and atraumatic.      Right Ear: External ear normal.      Left Ear: External ear normal.      Nose: Nose normal.   Eyes:      General:         Right eye: No discharge.         Left eye: No discharge.      Conjunctiva/sclera: Conjunctivae normal.      Pupils: Pupils are equal, round, and reactive to light.   Neck:      Thyroid: No thyromegaly.   Cardiovascular:      Rate and Rhythm: Normal rate and regular rhythm.      Pulses: Normal pulses.      Heart sounds: Normal heart sounds. No murmur heard.  Pulmonary:      Effort: Pulmonary effort is normal. No respiratory distress.      Breath sounds:  Normal breath sounds. No wheezing.   Abdominal:      General: Bowel sounds are normal.      Tenderness: There is abdominal tenderness in the right upper quadrant and left upper quadrant. There is no right CVA tenderness or left CVA tenderness.   Musculoskeletal:         General: Normal range of motion.      Cervical back: Normal range of motion and neck supple.   Lymphadenopathy:      Cervical: No cervical adenopathy.   Skin:     General: Skin is warm and dry.      Capillary Refill: Capillary refill takes less than 2 seconds.   Neurological:      Mental Status: She is alert and oriented to person, place, and time.      Cranial Nerves: No cranial nerve deficit.   Psychiatric:         Behavior: Behavior normal.         Thought Content: Thought content normal.         Judgment: Judgment normal.         Laboratory Reviewed ({Yes)  Lab Results   Component Value Date    WBC 6.96 03/19/2021    HGB 12.0 03/19/2021    HCT 39.2 03/19/2021     03/19/2021    CHOL 166 03/19/2021    TRIG 58 03/19/2021    HDL 41 03/19/2021    ALT 13 03/19/2021    AST 14 03/19/2021     03/19/2021    K 4.0 03/19/2021     03/19/2021    CREATININE 0.8 03/19/2021    BUN 14 03/19/2021    CO2 24 03/19/2021    TSH 1.891 03/19/2021    INR 1.1 11/21/2019    HGBA1C 5.1 03/19/2021       Ruma was seen today for abdominal pain.    Diagnoses and all orders for this visit:    Ruma was seen today for abdominal pain.    Diagnoses and all orders for this visit:    Chronic abdominal pain  -     Urinalysis, Reflex to Urine Culture Urine, Clean Catch  -     PREGNANCY TEST, URINE RAPID  -     Ambulatory referral/consult to Gastroenterology; Future    Essential hypertension    Generalized abdominal pain  -     Urinalysis, Reflex to Urine Culture Urine, Clean Catch  -     PREGNANCY TEST, URINE RAPID  -     Ambulatory referral/consult to Gastroenterology; Future    Obesity (BMI 30.0-34.9)    Will ref to GI  Discussed taking Protonix BID until able to  get an appt with GI  May have possible ulcer from symptoms  Will rule out UTI and preg since she is in child bearing age      Care Plan/Goals: Reviewed    Goals    None         Follow up: No follow-ups on file.    After visit summary was printed and given to patient upon discharge today.  Patient goals and care plan are included in After Visit Summary.

## 2022-03-10 ENCOUNTER — PATIENT MESSAGE (OUTPATIENT)
Dept: INTERNAL MEDICINE | Facility: CLINIC | Age: 37
End: 2022-03-10
Payer: COMMERCIAL

## 2022-03-10 LAB
BACTERIA UR CULT: NORMAL
BACTERIA UR CULT: NORMAL

## 2022-03-11 DIAGNOSIS — N30.00 ACUTE CYSTITIS WITHOUT HEMATURIA: Primary | ICD-10-CM

## 2022-03-11 RX ORDER — CIPROFLOXACIN 500 MG/1
500 TABLET ORAL EVERY 12 HOURS
Qty: 10 TABLET | Refills: 0 | Status: SHIPPED | OUTPATIENT
Start: 2022-03-11 | End: 2023-03-10

## 2022-03-12 ENCOUNTER — PATIENT OUTREACH (OUTPATIENT)
Dept: ADMINISTRATIVE | Facility: OTHER | Age: 37
End: 2022-03-12
Payer: COMMERCIAL

## 2022-03-12 NOTE — PROGRESS NOTES
Health Maintenance Due   Topic Date Due    Pneumococcal Vaccines (Age 0-64) (1 of 2 - PPSV23) Never done    COVID-19 Vaccine (3 - Booster for Moderna series) 09/08/2021     Updates were requested from care everywhere.  Chart was reviewed for overdue Proactive [facility] Encounters (PRAS[facility]NA) topics (CRS, Breast Cancer Screening, Eye exam)  Health Maintenance has been updated.  LINKS immunization registry triggered.  Immunizations were reconciled.

## 2022-03-14 ENCOUNTER — HOSPITAL ENCOUNTER (OUTPATIENT)
Dept: RADIOLOGY | Facility: HOSPITAL | Age: 37
Discharge: HOME | End: 2022-03-14
Attending: INTERNAL MEDICINE
Payer: COMMERCIAL

## 2022-03-14 ENCOUNTER — OFFICE VISIT (OUTPATIENT)
Dept: GASTROENTEROLOGY | Facility: CLINIC | Age: 37
End: 2022-03-14
Payer: COMMERCIAL

## 2022-03-14 VITALS
HEIGHT: 65 IN | SYSTOLIC BLOOD PRESSURE: 124 MMHG | HEART RATE: 73 BPM | OXYGEN SATURATION: 98 % | WEIGHT: 191.94 LBS | DIASTOLIC BLOOD PRESSURE: 96 MMHG | BODY MASS INDEX: 31.98 KG/M2

## 2022-03-14 DIAGNOSIS — G89.29 CHRONIC ABDOMINAL PAIN: ICD-10-CM

## 2022-03-14 DIAGNOSIS — R10.11 RUQ ABDOMINAL PAIN: ICD-10-CM

## 2022-03-14 DIAGNOSIS — K59.04 CHRONIC IDIOPATHIC CONSTIPATION: ICD-10-CM

## 2022-03-14 DIAGNOSIS — R10.11 RUQ ABDOMINAL PAIN: Primary | ICD-10-CM

## 2022-03-14 DIAGNOSIS — R10.9 CHRONIC ABDOMINAL PAIN: ICD-10-CM

## 2022-03-14 DIAGNOSIS — R10.84 GENERALIZED ABDOMINAL PAIN: ICD-10-CM

## 2022-03-14 PROCEDURE — 1159F PR MEDICATION LIST DOCUMENTED IN MEDICAL RECORD: ICD-10-PCS | Mod: CPTII,S$GLB,, | Performed by: INTERNAL MEDICINE

## 2022-03-14 PROCEDURE — 99999 PR PBB SHADOW E&M-EST. PATIENT-LVL V: ICD-10-PCS | Mod: PBBFAC,,, | Performed by: INTERNAL MEDICINE

## 2022-03-14 PROCEDURE — 3074F PR MOST RECENT SYSTOLIC BLOOD PRESSURE < 130 MM HG: ICD-10-PCS | Mod: CPTII,S$GLB,, | Performed by: INTERNAL MEDICINE

## 2022-03-14 PROCEDURE — 1159F MED LIST DOCD IN RCRD: CPT | Mod: CPTII,S$GLB,, | Performed by: INTERNAL MEDICINE

## 2022-03-14 PROCEDURE — 3080F PR MOST RECENT DIASTOLIC BLOOD PRESSURE >= 90 MM HG: ICD-10-PCS | Mod: CPTII,S$GLB,, | Performed by: INTERNAL MEDICINE

## 2022-03-14 PROCEDURE — 74019 RADEX ABDOMEN 2 VIEWS: CPT | Mod: TC

## 2022-03-14 PROCEDURE — 3080F DIAST BP >= 90 MM HG: CPT | Mod: CPTII,S$GLB,, | Performed by: INTERNAL MEDICINE

## 2022-03-14 PROCEDURE — 99203 OFFICE O/P NEW LOW 30 MIN: CPT | Mod: S$GLB,,, | Performed by: INTERNAL MEDICINE

## 2022-03-14 PROCEDURE — 3074F SYST BP LT 130 MM HG: CPT | Mod: CPTII,S$GLB,, | Performed by: INTERNAL MEDICINE

## 2022-03-14 PROCEDURE — 74019 RADEX ABDOMEN 2 VIEWS: CPT | Mod: 26,,, | Performed by: RADIOLOGY

## 2022-03-14 PROCEDURE — 74019 XR ABDOMEN FLAT AND ERECT: ICD-10-PCS | Mod: 26,,, | Performed by: RADIOLOGY

## 2022-03-14 PROCEDURE — 3008F BODY MASS INDEX DOCD: CPT | Mod: CPTII,S$GLB,, | Performed by: INTERNAL MEDICINE

## 2022-03-14 PROCEDURE — 99999 PR PBB SHADOW E&M-EST. PATIENT-LVL V: CPT | Mod: PBBFAC,,, | Performed by: INTERNAL MEDICINE

## 2022-03-14 PROCEDURE — 1160F RVW MEDS BY RX/DR IN RCRD: CPT | Mod: CPTII,S$GLB,, | Performed by: INTERNAL MEDICINE

## 2022-03-14 PROCEDURE — 3008F PR BODY MASS INDEX (BMI) DOCUMENTED: ICD-10-PCS | Mod: CPTII,S$GLB,, | Performed by: INTERNAL MEDICINE

## 2022-03-14 PROCEDURE — 1160F PR REVIEW ALL MEDS BY PRESCRIBER/CLIN PHARMACIST DOCUMENTED: ICD-10-PCS | Mod: CPTII,S$GLB,, | Performed by: INTERNAL MEDICINE

## 2022-03-14 PROCEDURE — 99203 PR OFFICE/OUTPT VISIT, NEW, LEVL III, 30-44 MIN: ICD-10-PCS | Mod: S$GLB,,, | Performed by: INTERNAL MEDICINE

## 2022-03-14 NOTE — PROGRESS NOTES
Subjective:       Patient ID: Ruma Keane is a 36 y.o. female.    Chief Complaint: Abdominal Pain (X 5months)    The patient is here with complaint of RUQ abdominal pain. She was seen in the ED in January last year with abdominal pain and her CT was remarkable for moderate stool throughout the colon, but was otherwise normal including her gallbladder. Her labs showed no anemia or leukocytosis. Her pancreatic enzyme was normal. There was borderline blood glucose.     She describes her pain as sharp at times, and at other ties grinding. There has been vomiting on 1 occasion a few months ago, but otherwise no nausea or vomiting. There has been no BRBPR or melena. There has been no anorexia or weight loss. There is no aversion to the sight/smell of food. There is no early satiety. She has previously been on Miralax but says she does not need it now. Bowels work better now but at one point she could go weeks without BMs with back ans stomach pains. She goes better now but only once a week.     Review of Systems   Constitutional: Negative for activity change, appetite change, chills, diaphoresis, fatigue, fever and unexpected weight change.   HENT: Negative for congestion, ear discharge, ear pain, hearing loss, nosebleeds, postnasal drip and tinnitus.    Eyes: Positive for visual disturbance. Negative for photophobia.   Respiratory: Negative for apnea, cough, choking, chest tightness, shortness of breath and wheezing.    Cardiovascular: Negative for chest pain, palpitations and leg swelling.   Gastrointestinal: Positive for abdominal pain and constipation. Negative for abdominal distention, anal bleeding, blood in stool, diarrhea, nausea, rectal pain and vomiting.   Genitourinary: Negative for difficulty urinating, dyspareunia, dysuria, flank pain, frequency, hematuria, menstrual problem, pelvic pain, urgency, vaginal bleeding and vaginal discharge.   Musculoskeletal: Negative for arthralgias, back pain, gait  problem, joint swelling, myalgias and neck stiffness.   Skin: Negative for pallor and rash.   Neurological: Positive for headaches. Negative for dizziness, tremors, seizures, syncope, speech difficulty, weakness and numbness.   Hematological: Negative for adenopathy.   Psychiatric/Behavioral: Negative for agitation, confusion, hallucinations, sleep disturbance and suicidal ideas.       Objective:      Physical Exam  Vitals reviewed.   Constitutional:       Appearance: She is well-developed.   HENT:      Head: Normocephalic and atraumatic.   Eyes:      General: No scleral icterus.        Right eye: No discharge.         Left eye: No discharge.      Conjunctiva/sclera: Conjunctivae normal.      Pupils: Pupils are equal, round, and reactive to light.   Neck:      Thyroid: No thyromegaly.      Vascular: No JVD.   Cardiovascular:      Rate and Rhythm: Normal rate and regular rhythm.      Heart sounds: Normal heart sounds. No murmur heard.    No friction rub. No gallop.   Pulmonary:      Effort: Pulmonary effort is normal. No respiratory distress.      Breath sounds: Normal breath sounds. No wheezing or rales.   Chest:      Chest wall: No tenderness.   Abdominal:      General: Abdomen is flat. Bowel sounds are normal. There is no distension.      Palpations: Abdomen is soft. There is no mass.      Tenderness: There is abdominal tenderness. There is no guarding or rebound.      Comments: Periumbilical to LUQ abdominal tenderness to dep palpation. Area where pain complaint is not presently tender.    Musculoskeletal:         General: Normal range of motion.      Cervical back: Normal range of motion and neck supple.   Lymphadenopathy:      Cervical: No cervical adenopathy.   Skin:     General: Skin is warm and dry.      Coloration: Skin is not pale.      Findings: No erythema or rash.   Neurological:      Mental Status: She is alert and oriented to person, place, and time.      Motor: No abnormal muscle tone.       Coordination: Coordination normal.      Deep Tendon Reflexes: Reflexes are normal and symmetric.   Psychiatric:         Behavior: Behavior normal.         Thought Content: Thought content normal.         Judgment: Judgment normal.         Assessment:   RUQ abdominal pain  -     X-Ray Abdomen Flat And Erect; Future; Expected date: 03/14/2022  -     US Abdomen Complete; Future; Expected date: 03/14/2022    Generalized abdominal pain  -     Ambulatory referral/consult to Gastroenterology    Chronic abdominal pain  -     Ambulatory referral/consult to Gastroenterology  -     X-Ray Abdomen Flat And Erect; Future; Expected date: 03/14/2022    Chronic idiopathic constipation  -     X-Ray Abdomen Flat And Erect; Future; Expected date: 03/14/2022          Plan:   As above.

## 2022-03-21 ENCOUNTER — HOSPITAL ENCOUNTER (OUTPATIENT)
Dept: RADIOLOGY | Facility: HOSPITAL | Age: 37
Discharge: HOME | End: 2022-03-21
Attending: INTERNAL MEDICINE
Payer: COMMERCIAL

## 2022-03-21 DIAGNOSIS — R10.11 RUQ ABDOMINAL PAIN: ICD-10-CM

## 2022-03-21 PROCEDURE — 76700 US EXAM ABDOM COMPLETE: CPT | Mod: TC

## 2022-05-09 ENCOUNTER — PATIENT MESSAGE (OUTPATIENT)
Dept: INTERNAL MEDICINE | Facility: CLINIC | Age: 37
End: 2022-05-09
Payer: COMMERCIAL

## 2022-05-14 PROCEDURE — 99284 EMERGENCY DEPT VISIT MOD MDM: CPT

## 2022-05-15 ENCOUNTER — HOSPITAL ENCOUNTER (EMERGENCY)
Facility: HOSPITAL | Age: 37
Discharge: HOME | End: 2022-05-15
Attending: EMERGENCY MEDICINE
Payer: COMMERCIAL

## 2022-05-15 VITALS
WEIGHT: 188.81 LBS | HEART RATE: 86 BPM | SYSTOLIC BLOOD PRESSURE: 146 MMHG | DIASTOLIC BLOOD PRESSURE: 83 MMHG | RESPIRATION RATE: 18 BRPM | BODY MASS INDEX: 31.42 KG/M2 | OXYGEN SATURATION: 100 % | TEMPERATURE: 99 F

## 2022-05-15 DIAGNOSIS — J00 ACUTE NASOPHARYNGITIS: Primary | ICD-10-CM

## 2022-05-15 DIAGNOSIS — R05.9 COUGH: ICD-10-CM

## 2022-05-15 LAB — GROUP A STREP, MOLECULAR: NEGATIVE

## 2022-05-15 PROCEDURE — 87651 STREP A DNA AMP PROBE: CPT | Performed by: NURSE PRACTITIONER

## 2022-05-15 RX ORDER — IBUPROFEN 800 MG/1
800 TABLET ORAL EVERY 6 HOURS PRN
Qty: 20 TABLET | Refills: 0 | Status: SHIPPED | OUTPATIENT
Start: 2022-05-15 | End: 2023-09-19

## 2022-05-15 RX ORDER — PROMETHAZINE HYDROCHLORIDE AND DEXTROMETHORPHAN HYDROBROMIDE 6.25; 15 MG/5ML; MG/5ML
5 SYRUP ORAL EVERY 4 HOURS PRN
Qty: 180 ML | Refills: 0 | Status: SHIPPED | OUTPATIENT
Start: 2022-05-15 | End: 2022-05-25

## 2022-05-15 RX ORDER — METHYLPREDNISOLONE 4 MG/1
TABLET ORAL
Qty: 1 EACH | Refills: 0 | Status: SHIPPED | OUTPATIENT
Start: 2022-05-15 | End: 2023-03-10

## 2022-05-15 NOTE — ED PROVIDER NOTES
Encounter Date: 5/14/2022       History     Chief Complaint   Patient presents with    Sore Throat     Sore throat, cough, left ear pain/pressure. At home covid test negative     Patient is a 36-year-old female who presents with complaints of sore throat, cough, left ear pain and pressure.  Onset of symptoms was yesterday.  Patient reports taking home COVID test that was negative.  Denies any chest pain, shortness of breath.  No distress noted at this time.        Review of patient's allergies indicates:   Allergen Reactions    Bactrim [sulfamethoxazole-trimethoprim] Swelling     Past Medical History:   Diagnosis Date    Helicobacter pylori (H. pylori) infection     Hepatitis C antibody positive in blood 01/06/2021    RNA NEGATIVE    Hepatitis C virus infection cured after antiviral drug therapy 01/2022    Hypertension     Migraines      Past Surgical History:   Procedure Laterality Date    DILATION AND CURETTAGE OF UTERUS      TONSILLECTOMY       Family History   Problem Relation Age of Onset    Hypertension Father     Hypertension Sister      Social History     Tobacco Use    Smoking status: Never Smoker    Smokeless tobacco: Never Used   Substance Use Topics    Alcohol use: No    Drug use: Never     Review of Systems   Constitutional: Negative for fever.   HENT: Positive for ear pain (Left) and sore throat.    Respiratory: Positive for cough. Negative for shortness of breath.    Cardiovascular: Negative for chest pain.   Gastrointestinal: Negative for nausea.   Genitourinary: Negative for dysuria.   Musculoskeletal: Negative for back pain.   Skin: Negative for rash.   Neurological: Negative for weakness.   Hematological: Does not bruise/bleed easily.       Physical Exam     Initial Vitals [05/15/22 0001]   BP Pulse Resp Temp SpO2   (!) 146/83 86 18 99.1 °F (37.3 °C) 100 %      MAP       --         Physical Exam    Nursing note and vitals reviewed.  Constitutional: She appears well-developed and  well-nourished.   HENT:   Head: Normocephalic and atraumatic.   Eyes: EOM are normal. Pupils are equal, round, and reactive to light.   Neck: Neck supple.   Normal range of motion.  Cardiovascular: Normal rate, regular rhythm, normal heart sounds and intact distal pulses.   Pulmonary/Chest: Breath sounds normal.   Abdominal: Abdomen is soft. Bowel sounds are normal.   Musculoskeletal:         General: Normal range of motion.      Cervical back: Normal range of motion and neck supple.     Neurological: She is alert and oriented to person, place, and time. She has normal strength and normal reflexes.   Skin: Skin is warm and dry.         ED Course   Procedures  Labs Reviewed   GROUP A STREP, MOLECULAR          Imaging Results    None          Medications - No data to display  Medical Decision Making:   ED Management:  I discussed with patient and/or family/caretaker that evaluation in the ED does not suggest any emergent or life threatening medical conditions requiring immediate intervention beyond what was provided in the ED, and I believe patient is safe for discharge. Regardless, an unremarkable evaluation in the ED does not preclude the development or presence of a serious of life threatening condition. As such, patient was instructed to return immediately for any worsening or change in current symptoms.                        Clinical Impression:   Final diagnoses:  [J00] Acute nasopharyngitis (Primary)  [R05.9] Cough          ED Disposition Condition    Discharge Stable        ED Prescriptions     Medication Sig Dispense Start Date End Date Auth. Provider    promethazine-dextromethorphan (PROMETHAZINE-DM) 6.25-15 mg/5 mL Syrp Take 5 mLs by mouth every 4 (four) hours as needed (cough). 180 mL 5/15/2022 5/25/2022 Brett Estevez NP    ibuprofen (ADVIL,MOTRIN) 800 MG tablet Take 1 tablet (800 mg total) by mouth every 6 (six) hours as needed for Pain. 20 tablet 5/15/2022  Brett Estevez NP    methylPREDNISolone  (MEDROL DOSEPACK) 4 mg tablet Take per package directions 1 each 5/15/2022  Brett Estevez NP        Follow-up Information     Follow up With Specialties Details Why Contact Info    Sherrie Cristina, DO Internal Medicine  As needed [id number] [facility] [address] [address] [place] [id number]  [phone]             Brett Estevez NP  05/15/22 0052

## 2022-08-24 DIAGNOSIS — I10 ESSENTIAL HYPERTENSION: ICD-10-CM

## 2022-12-09 DIAGNOSIS — I10 ESSENTIAL HYPERTENSION: ICD-10-CM

## 2022-12-13 DIAGNOSIS — I10 ESSENTIAL HYPERTENSION: ICD-10-CM

## 2022-12-13 RX ORDER — AMLODIPINE BESYLATE 5 MG/1
5 TABLET ORAL DAILY
Qty: 30 TABLET | Refills: 11 | OUTPATIENT
Start: 2022-12-13 | End: 2023-12-13

## 2022-12-13 RX ORDER — AMLODIPINE BESYLATE 5 MG/1
TABLET ORAL
Qty: 30 TABLET | Refills: 2 | Status: SHIPPED | OUTPATIENT
Start: 2022-12-13 | End: 2023-03-02

## 2022-12-13 NOTE — TELEPHONE ENCOUNTER
No new care gaps identified.  [facility] Embedded Care Gaps. Reference number: [id number]. 12/13/2022   11:32:22 AM CST

## 2023-03-05 ENCOUNTER — HOSPITAL ENCOUNTER (EMERGENCY)
Facility: HOSPITAL | Age: 38
Discharge: HOME | End: 2023-03-05
Attending: EMERGENCY MEDICINE
Payer: COMMERCIAL

## 2023-03-05 VITALS
OXYGEN SATURATION: 99 % | SYSTOLIC BLOOD PRESSURE: 150 MMHG | HEART RATE: 70 BPM | HEIGHT: 65 IN | WEIGHT: 196.13 LBS | RESPIRATION RATE: 18 BRPM | BODY MASS INDEX: 32.68 KG/M2 | TEMPERATURE: 99 F | DIASTOLIC BLOOD PRESSURE: 99 MMHG

## 2023-03-05 DIAGNOSIS — S39.012A STRAIN OF LUMBAR REGION, INITIAL ENCOUNTER: Primary | ICD-10-CM
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
## 2023-03-05 LAB — B-HCG UR QL: NEGATIVE

## 2023-03-05 PROCEDURE — 81025 URINE PREGNANCY TEST: CPT | Performed by: PHYSICIAN ASSISTANT

## 2023-03-05 PROCEDURE — 99285 EMERGENCY DEPT VISIT HI MDM: CPT | Mod: 25

## 2023-03-05 RX ORDER — METHOCARBAMOL 750 MG/1
1500 TABLET, FILM COATED ORAL 3 TIMES DAILY
Qty: 30 TABLET | Refills: 0 | Status: SHIPPED | OUTPATIENT
Start: 2023-03-05 | End: 2023-03-10
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
## 2023-03-10 ENCOUNTER — OFFICE VISIT (OUTPATIENT)
Dept: INTERNAL MEDICINE | Facility: CLINIC | Age: 38
End: 2023-03-10
Payer: COMMERCIAL

## 2023-03-10 VITALS
WEIGHT: 190.06 LBS | OXYGEN SATURATION: 99 % | HEART RATE: 95 BPM | TEMPERATURE: 98 F | BODY MASS INDEX: 31.67 KG/M2 | SYSTOLIC BLOOD PRESSURE: 132 MMHG | HEIGHT: 65 IN | DIASTOLIC BLOOD PRESSURE: 86 MMHG | RESPIRATION RATE: 17 BRPM

## 2023-03-10 DIAGNOSIS — I10 ESSENTIAL HYPERTENSION: ICD-10-CM
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
## 2023-03-10 DIAGNOSIS — S39.012A STRAIN OF LUMBAR REGION, INITIAL ENCOUNTER: ICD-10-CM

## 2023-03-10 DIAGNOSIS — E66.9 OBESITY (BMI 30.0-34.9): ICD-10-CM

## 2023-03-10 DIAGNOSIS — G47.00 INSOMNIA, UNSPECIFIED TYPE: ICD-10-CM

## 2023-03-10 PROCEDURE — 99999 PR PBB SHADOW E&M-EST. PATIENT-LVL IV: CPT | Mod: PBBFAC,,,

## 2023-03-10 PROCEDURE — 99214 OFFICE O/P EST MOD 30 MIN: CPT | Mod: S$GLB,,,

## 2023-03-10 PROCEDURE — 99999 PR PBB SHADOW E&M-EST. PATIENT-LVL IV: ICD-10-PCS | Mod: PBBFAC,,,

## 2023-03-10 PROCEDURE — 99214 PR OFFICE/OUTPT VISIT, EST, LEVL IV, 30-39 MIN: ICD-10-PCS | Mod: S$GLB,,,

## 2023-03-10 RX ORDER — HYDROCHLOROTHIAZIDE 25 MG/1
25 TABLET ORAL DAILY
Qty: 90 TABLET | Refills: 1 | Status: SHIPPED | OUTPATIENT
Start: 2023-03-10

## 2023-03-10 RX ORDER — TRAZODONE HYDROCHLORIDE 50 MG/1
50 TABLET ORAL NIGHTLY
Qty: 30 TABLET | Refills: 1 | Status: SHIPPED | OUTPATIENT
Start: 2023-03-10 | End: 2024-03-11
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
## 2023-03-14 ENCOUNTER — CLINICAL SUPPORT (OUTPATIENT)
Dept: REHABILITATION | Facility: HOSPITAL | Age: 38
End: 2023-03-14
Payer: COMMERCIAL

## 2023-03-14 DIAGNOSIS — M53.86 DECREASED ROM OF LUMBAR SPINE: ICD-10-CM

## 2023-03-14 DIAGNOSIS — G89.29 CHRONIC BILATERAL LOW BACK PAIN WITHOUT SCIATICA: ICD-10-CM
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
## 2023-03-14 DIAGNOSIS — S39.012A STRAIN OF LUMBAR REGION, INITIAL ENCOUNTER: ICD-10-CM

## 2023-03-14 DIAGNOSIS — M54.50 CHRONIC BILATERAL LOW BACK PAIN WITHOUT SCIATICA: ICD-10-CM

## 2023-03-14 PROCEDURE — 97014 ELECTRIC STIMULATION THERAPY: CPT | Mod: PN

## 2023-03-14 PROCEDURE — 97110 THERAPEUTIC EXERCISES: CPT | Mod: PN

## 2023-03-14 PROCEDURE — 97112 NEUROMUSCULAR REEDUCATION: CPT | Mod: PN

## 2023-03-14 PROCEDURE — 97161 PT EVAL LOW COMPLEX 20 MIN: CPT | Mod: PN
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
## 2023-04-04 ENCOUNTER — PATIENT MESSAGE (OUTPATIENT)
Dept: INTERNAL MEDICINE | Facility: CLINIC | Age: 38
End: 2023-04-04
Payer: COMMERCIAL

## 2023-05-31 DIAGNOSIS — I10 ESSENTIAL HYPERTENSION: ICD-10-CM

## 2023-05-31 RX ORDER — AMLODIPINE BESYLATE 5 MG/1
TABLET ORAL
Qty: 90 TABLET | Refills: 0 | Status: SHIPPED | OUTPATIENT
Start: 2023-05-31 | End: 2023-09-19 | Stop reason: SDUPTHER

## 2023-07-05 ENCOUNTER — HOSPITAL ENCOUNTER (EMERGENCY)
Facility: HOSPITAL | Age: 38
Discharge: HOME | End: 2023-07-05
Attending: EMERGENCY MEDICINE
Payer: COMMERCIAL

## 2023-07-05 VITALS
DIASTOLIC BLOOD PRESSURE: 84 MMHG | SYSTOLIC BLOOD PRESSURE: 130 MMHG | BODY MASS INDEX: 31.97 KG/M2 | TEMPERATURE: 98 F | OXYGEN SATURATION: 100 % | RESPIRATION RATE: 18 BRPM | HEART RATE: 74 BPM | WEIGHT: 192.13 LBS

## 2023-07-05 DIAGNOSIS — I10 HYPERTENSION, UNSPECIFIED TYPE: Primary | ICD-10-CM

## 2023-07-05 PROCEDURE — 99283 EMERGENCY DEPT VISIT LOW MDM: CPT

## 2023-07-05 PROCEDURE — 25000003 PHARM REV CODE 250: Performed by: NURSE PRACTITIONER

## 2023-07-05 RX ORDER — CLONIDINE HYDROCHLORIDE 0.1 MG/1
0.1 TABLET ORAL
Status: COMPLETED | OUTPATIENT
Start: 2023-07-05 | End: 2023-07-05

## 2023-07-05 RX ADMIN — CLONIDINE HYDROCHLORIDE 0.1 MG: 0.1 TABLET ORAL at 05:07

## 2023-07-06 NOTE — ED PROVIDER NOTES
HISTORY     Chief Complaint   Patient presents with    Hypertension     Pt reports BP too high. Pt is on BP meds. Pt reports HA and blurry vision. Pt denies CP, SOB     Review of patient's allergies indicates:   Allergen Reactions    Bactrim [sulfamethoxazole-trimethoprim] Swelling        HPI   37-year-old female presents emergency department for hypertension.  Associated symptoms include headache and blurred vision.  Patient reports she takes amlodipine 5 mg and hydrochlorothiazide 25 mg daily.  Patient reports she is been compliant with her medication regimen.  Patient denies any fever, chills, chest pain, shortness of breath, back pain, abdominal pain, nausea, vomiting, and all other concerns at this time.    The history is provided by the patient. No  was used.      PCP: Sherrie Cristina DO     Past Medical History:  Past Medical History:   Diagnosis Date    Helicobacter pylori (H. pylori) infection     Hepatitis C antibody positive in blood 01/06/2021    RNA NEGATIVE    Hepatitis C virus infection cured after antiviral drug therapy 01/2022    Hypertension     Migraines         Past Surgical History:  Past Surgical History:   Procedure Laterality Date    CERVICAL BIOPSY  W/ LOOP ELECTRODE EXCISION  04/21/2022    LGSIL/BOY-I per Dr. Cowan    COLPOSCOPY  03/22/2022    LGSIL/BOY-I    DILATION AND CURETTAGE OF UTERUS      Endocervical Curettage  04/21/2022    Benign Tissue    TONSILLECTOMY          Family History:  Family History   Problem Relation Age of Onset    Hypertension Father     Hypertension Sister         Social History:  Social History     Tobacco Use    Smoking status: Never    Smokeless tobacco: Never   Substance and Sexual Activity    Alcohol use: No    Drug use: Never    Sexual activity: Yes     Partners: Male         ROS   Review of Systems   Constitutional:  Negative for fever.   HENT:  Negative for sore throat.    Eyes:  Negative for visual disturbance.   Respiratory:   Negative for shortness of breath.    Cardiovascular:  Negative for chest pain.   Gastrointestinal:  Negative for abdominal pain, nausea and vomiting.   Genitourinary:  Negative for dysuria.   Musculoskeletal:  Negative for back pain.   Skin:  Negative for rash.   Neurological:  Positive for headaches. Negative for weakness.   Hematological:  Does not bruise/bleed easily.     PHYSICAL EXAM     Initial Vitals [07/05/23 1646]   BP Pulse Resp Temp SpO2   (!) 159/95 70 18 98.4 °F (36.9 °C) 99 %      MAP       --           Physical Exam    Nursing note and vitals reviewed.  Constitutional: She appears well-developed and well-nourished. She is not diaphoretic. No distress.   HENT:   Head: Normocephalic and atraumatic.   Eyes: Right eye exhibits no discharge. Left eye exhibits no discharge.   Neck: Neck supple.   Normal range of motion.  Cardiovascular:  Normal rate.           Pulmonary/Chest: No respiratory distress.   Abdominal: She exhibits no distension.   Musculoskeletal:         General: Normal range of motion.      Cervical back: Normal range of motion and neck supple.     Neurological: She is alert and oriented to person, place, and time. She has normal strength.   Skin: Skin is warm and dry.   Psychiatric: She has a normal mood and affect. Her behavior is normal. Thought content normal.        ED COURSE   Procedures  ED ONGOING VITALS:  Vitals:    07/05/23 1646 07/05/23 1744   BP: (!) 159/95 130/84   Pulse: 70 74   Resp: 18 18   Temp: 98.4 °F (36.9 °C) 98.3 °F (36.8 °C)   TempSrc: Oral Oral   SpO2: 99% 100%   Weight: 87.1 kg (192 lb 2.1 oz)          ABNORMAL LAB VALUES:  Labs Reviewed - No data to display      ALL LAB VALUES:  none      RADIOLOGY STUDIES:  Imaging Results    None                   The above vital signs and test results have been reviewed by the emergency provider.     ED Medications:  Medications   cloNIDine tablet 0.1 mg (0.1 mg Oral Given 7/5/23 1700)       Discharge Medication List as of  7/5/2023  6:28 PM        Discharge Medications:  Discharge Medication List as of 7/5/2023  6:28 PM          Follow-up Information       pcp of choice.    Why: As needed             [facility] - Emergency Dept..    Specialty: Emergency Medicine  Why: If symptoms worsen  Contact information:  [address] [facility] [address] [phone]                          I discussed with patient and/or family/caretaker that evaluation in the ED does not suggest any emergent or life threatening medical conditions requiring immediate intervention beyond what was provided in the ED, and I believe patient is safe for discharge. Regardless, an unremarkable evaluation in the ED does not preclude the development or presence of a serious or life threatening condition. As such, patient was instructed to return immediately for any worsening or change in current symptoms.        MEDICAL DECISION MAKING   Medical Decision Making:   Initial Assessment:   Patient presents the ER for hypertension, blurred vision, headache.  Differential Diagnosis:   Elevated blood pressure.  Migraine, headache  ED Management:  Patient reports symptoms have resolved after medication administration here in the ER.  Patient reports she will monitor blood pressure at home.  Patient instructed to increase her amlodipine from 5 mg daily to 10 mg daily if systolic blood pressures greater than 140.  Patient instructed to keep records of blood pressure and follow up with her PCP.  Patient instructed to return to the ER for any worsening or concerns.             CLINICAL IMPRESSION       ICD-10-CM ICD-9-CM   1. Hypertension, unspecified type  I10 401.9       Disposition:   Disposition: Discharged  Condition: Stable       Gregory Bennett NP  07/05/23 2054

## 2023-07-07 ENCOUNTER — HOSPITAL ENCOUNTER (OUTPATIENT)
Dept: RADIOLOGY | Facility: HOSPITAL | Age: 38
Discharge: HOME | End: 2023-07-07
Payer: COMMERCIAL

## 2023-07-07 ENCOUNTER — OFFICE VISIT (OUTPATIENT)
Dept: INTERNAL MEDICINE | Facility: CLINIC | Age: 38
End: 2023-07-07
Payer: COMMERCIAL

## 2023-07-07 VITALS
DIASTOLIC BLOOD PRESSURE: 72 MMHG | WEIGHT: 193.31 LBS | HEIGHT: 65 IN | SYSTOLIC BLOOD PRESSURE: 124 MMHG | OXYGEN SATURATION: 98 % | TEMPERATURE: 99 F | HEART RATE: 69 BPM | BODY MASS INDEX: 32.21 KG/M2

## 2023-07-07 DIAGNOSIS — M54.12 RADICULAR PAIN OF SHOULDER: Primary | ICD-10-CM

## 2023-07-07 DIAGNOSIS — M25.511 ACUTE PAIN OF RIGHT SHOULDER: ICD-10-CM

## 2023-07-07 DIAGNOSIS — R07.89 MUSCULOSKELETAL CHEST PAIN: ICD-10-CM

## 2023-07-07 DIAGNOSIS — E66.9 OBESITY (BMI 30.0-34.9): ICD-10-CM

## 2023-07-07 DIAGNOSIS — I10 ESSENTIAL HYPERTENSION: ICD-10-CM

## 2023-07-07 PROCEDURE — 3074F PR MOST RECENT SYSTOLIC BLOOD PRESSURE < 130 MM HG: ICD-10-PCS | Mod: CPTII,S$GLB,,

## 2023-07-07 PROCEDURE — 99215 OFFICE O/P EST HI 40 MIN: CPT | Mod: S$GLB,,,

## 2023-07-07 PROCEDURE — 1159F PR MEDICATION LIST DOCUMENTED IN MEDICAL RECORD: ICD-10-PCS | Mod: CPTII,S$GLB,,

## 2023-07-07 PROCEDURE — 3074F SYST BP LT 130 MM HG: CPT | Mod: CPTII,S$GLB,,

## 2023-07-07 PROCEDURE — 1159F MED LIST DOCD IN RCRD: CPT | Mod: CPTII,S$GLB,,

## 2023-07-07 PROCEDURE — 3008F PR BODY MASS INDEX (BMI) DOCUMENTED: ICD-10-PCS | Mod: CPTII,S$GLB,,

## 2023-07-07 PROCEDURE — 73030 X-RAY EXAM OF SHOULDER: CPT | Mod: TC,RT

## 2023-07-07 PROCEDURE — 99999 PR PBB SHADOW E&M-EST. PATIENT-LVL IV: ICD-10-PCS | Mod: PBBFAC,,,

## 2023-07-07 PROCEDURE — 73030 X-RAY EXAM OF SHOULDER: CPT | Mod: 26,RT,, | Performed by: RADIOLOGY

## 2023-07-07 PROCEDURE — 3078F DIAST BP <80 MM HG: CPT | Mod: CPTII,S$GLB,,

## 2023-07-07 PROCEDURE — 99999 PR PBB SHADOW E&M-EST. PATIENT-LVL IV: CPT | Mod: PBBFAC,,,

## 2023-07-07 PROCEDURE — 3008F BODY MASS INDEX DOCD: CPT | Mod: CPTII,S$GLB,,

## 2023-07-07 PROCEDURE — 99215 PR OFFICE/OUTPT VISIT, EST, LEVL V, 40-54 MIN: ICD-10-PCS | Mod: S$GLB,,,

## 2023-07-07 PROCEDURE — 3078F PR MOST RECENT DIASTOLIC BLOOD PRESSURE < 80 MM HG: ICD-10-PCS | Mod: CPTII,S$GLB,,

## 2023-07-07 PROCEDURE — 73030 XR SHOULDER COMPLETE 2 OR MORE VIEWS RIGHT: ICD-10-PCS | Mod: 26,RT,, | Performed by: RADIOLOGY

## 2023-07-07 NOTE — PROGRESS NOTES
Ruma Keane  07/07/2023  [id number]    Sherrie Cristina DO  Patient Care Team:  Sherrie Cristina DO as PCP - General (Internal Medicine)  Corby Cowan MD as Obstetrician (Obstetrics and Gynecology)          Visit Type:an urgent visit for a new problem    Chief Complaint:  Chief Complaint   Patient presents with    Chest Pain    Arm Pain     right        History of Present Illness:    Patient went to the ER on 7/5  37-year-old female presents emergency department for hypertension.  Associated symptoms include headache and blurred vision.  Patient reports she takes amlodipine 5 mg and hydrochlorothiazide 25 mg daily.  Patient reports she is been compliant with her medication regimen. While in the ER she was given Clonidine 0.1 mg and discharged home    HTN  Norvasc 5 mg   Norvasc 25 mg  She was told at the ER that if systolic is over 140 she can take 2 of the Norvasc meds  Denies CP, HA, change in vision  Checking BP at home. Watching diet     Side side upper pain with numbness in right arm   Has pain in right shoulder joint  Started on last night  Tylenol did help with pain  She has not been lifting on heavy objects     She feels the numbness all of the time   Pain starts in shoulder and radiates down her arm     History:  Past Medical History:   Diagnosis Date    Helicobacter pylori (H. pylori) infection     Hepatitis C antibody positive in blood 01/06/2021    RNA NEGATIVE    Hepatitis C virus infection cured after antiviral drug therapy 01/2022    Hypertension     Migraines      Past Surgical History:   Procedure Laterality Date    CERVICAL BIOPSY  W/ LOOP ELECTRODE EXCISION  04/21/2022    LGSIL/BOY-I per Dr. Cowan    COLPOSCOPY  03/22/2022    LGSIL/BOY-I    DILATION AND CURETTAGE OF UTERUS      Endocervical Curettage  04/21/2022    Benign Tissue    TONSILLECTOMY       Family History   Problem Relation Age of Onset    Hypertension Father     Hypertension Sister      Social History     Socioeconomic History     Marital status: Single   Tobacco Use    Smoking status: Never    Smokeless tobacco: Never   Substance and Sexual Activity    Alcohol use: No    Drug use: Never    Sexual activity: Yes     Partners: Male     Patient Active Problem List   Diagnosis    Essential hypertension    Obesity (BMI 30.0-34.9)    Chronic bilateral low back pain without sciatica    Decreased ROM of lumbar spine     Review of patient's allergies indicates:   Allergen Reactions    Bactrim [sulfamethoxazole-trimethoprim] Swelling       The following were reviewed at this visit: active problem list, medication list, allergies, family history, social history, and health maintenance.    Medications:  Current Outpatient Medications on File Prior to Visit   Medication Sig Dispense Refill    amLODIPine (NORVASC) 5 MG tablet TAKE 1 TABLET(5 MG) BY MOUTH EVERY DAY 90 tablet 0    DULCOLAX, BISACODYL, ORAL Take by mouth.      hydroCHLOROthiazide (HYDRODIURIL) 25 MG tablet Take 1 tablet (25 mg total) by mouth once daily. 90 tablet 1    ibuprofen (ADVIL,MOTRIN) 800 MG tablet Take 1 tablet (800 mg total) by mouth every 6 (six) hours as needed for Pain. 20 tablet 0    propranoloL (INDERAL LA) 60 MG 24 hr capsule Take 60 mg by mouth once daily.      sertraline (ZOLOFT) 25 MG tablet Take 25 mg by mouth once daily.      traZODone (DESYREL) 50 MG tablet Take 1 tablet (50 mg total) by mouth every evening. 30 tablet 1    VIENVA 0.1-20 mg-mcg per tablet TAKE 1 TABLET BY MOUTH EVERY DAY 28 tablet 5    zolpidem (AMBIEN) 5 MG Tab Take 5 mg by mouth nightly as needed.       No current facility-administered medications on file prior to visit.       Medications have been reviewed and reconciled with patient at this visit.  Barriers to medications reviewed with patient.    Adverse reactions to current medications reviewed with patient..    Over the counter medications reviewed and reconciled with patient.    Exam:  Wt Readings from Last 3 Encounters:   07/05/23 87.1 kg  (192 lb 2.1 oz)   03/10/23 86.2 kg (190 lb 0.6 oz)   03/05/23 89 kg (196 lb 1.6 oz)     Temp Readings from Last 3 Encounters:   07/05/23 98.3 °F (36.8 °C) (Oral)   03/10/23 98 °F (36.7 °C) (Temporal)   03/05/23 98.7 °F (37.1 °C) (Oral)     BP Readings from Last 3 Encounters:   07/05/23 130/84   03/10/23 132/86   03/05/23 (!) 150/99     Pulse Readings from Last 3 Encounters:   07/05/23 74   03/10/23 95   03/05/23 70     There is no height or weight on file to calculate BMI.      Review of Systems   Musculoskeletal:  Positive for joint pain and myalgias.   Neurological:         Numbness   Physical Exam  Vitals and nursing note reviewed.   Constitutional:       General: She is not in acute distress.     Appearance: She is well-developed. She is obese. She is not diaphoretic.   HENT:      Head: Normocephalic and atraumatic.      Right Ear: External ear normal.      Left Ear: External ear normal.   Eyes:      General:         Right eye: No discharge.         Left eye: No discharge.      Conjunctiva/sclera: Conjunctivae normal.      Pupils: Pupils are equal, round, and reactive to light.   Cardiovascular:      Rate and Rhythm: Normal rate and regular rhythm.      Pulses:           Radial pulses are 2+ on the right side and 2+ on the left side.      Heart sounds: Normal heart sounds. No murmur heard.  Pulmonary:      Effort: Pulmonary effort is normal. No respiratory distress.      Breath sounds: Normal breath sounds. No wheezing.   Chest:       Musculoskeletal:         General: Tenderness present.      Right shoulder: Tenderness present. Normal range of motion.      Comments: Bilateral hand  strong and equal    Neurological:      Mental Status: She is alert and oriented to person, place, and time.   Psychiatric:         Behavior: Behavior normal.         Thought Content: Thought content normal.         Judgment: Judgment normal.       Laboratory Reviewed ({Yes)  Lab Results   Component Value Date    WBC 6.99  04/14/2022    HGB 12.2 04/14/2022    HCT 37.8 04/14/2022     04/14/2022    CHOL 166 03/19/2021    TRIG 58 03/19/2021    HDL 41 03/19/2021    ALT 13 03/19/2021    AST 14 03/19/2021     03/19/2021    K 4.0 03/19/2021     03/19/2021    CREATININE 0.8 03/19/2021    BUN 14 03/19/2021    CO2 24 03/19/2021    TSH 1.891 03/19/2021    INR 1.1 11/21/2019    HGBA1C 5.1 03/19/2021         Ruma was seen today for chest pain and arm pain.    Diagnoses and all orders for this visit:    Radicular pain of shoulder  -     X-ray Shoulder 2 or More Views Right; Future    Musculoskeletal chest pain    Essential hypertension  At visit, Blood pressure is at goal. Continue current medications    DASH diet and exercise as tolerated  Can increase Norvasc to 10 mg if home BP elevates      Obesity (BMI 30.0-34.9)  Encouraged healthy diet and exercise as tolerated to help bring BMI into normal range.         Xray to rule out cause of numbness  If negative can order EMG  Cont taking OTC pain meds  Will do a trail of muscle relaxer     Care Plan/Goals: Reviewed    Goals    None         Follow up: No follow-ups on file.    After visit summary was printed and given to patient upon discharge today.  Patient goals and care plan are included in After Visit Summary.

## 2023-09-19 ENCOUNTER — OFFICE VISIT (OUTPATIENT)
Dept: INTERNAL MEDICINE | Facility: CLINIC | Age: 38
End: 2023-09-19
Payer: COMMERCIAL

## 2023-09-19 VITALS
BODY MASS INDEX: 31.44 KG/M2 | RESPIRATION RATE: 20 BRPM | OXYGEN SATURATION: 99 % | TEMPERATURE: 97 F | SYSTOLIC BLOOD PRESSURE: 140 MMHG | HEART RATE: 74 BPM | WEIGHT: 188.69 LBS | HEIGHT: 65 IN | DIASTOLIC BLOOD PRESSURE: 100 MMHG

## 2023-09-19 DIAGNOSIS — I10 ESSENTIAL HYPERTENSION: Primary | ICD-10-CM

## 2023-09-19 DIAGNOSIS — G47.00 INSOMNIA, UNSPECIFIED TYPE: ICD-10-CM

## 2023-09-19 DIAGNOSIS — E66.9 OBESITY (BMI 30.0-34.9): ICD-10-CM

## 2023-09-19 PROCEDURE — 99214 PR OFFICE/OUTPT VISIT, EST, LEVL IV, 30-39 MIN: ICD-10-PCS | Mod: S$GLB,,, | Performed by: INTERNAL MEDICINE

## 2023-09-19 PROCEDURE — 3077F SYST BP >= 140 MM HG: CPT | Mod: CPTII,S$GLB,, | Performed by: INTERNAL MEDICINE

## 2023-09-19 PROCEDURE — 1159F PR MEDICATION LIST DOCUMENTED IN MEDICAL RECORD: ICD-10-PCS | Mod: CPTII,S$GLB,, | Performed by: INTERNAL MEDICINE

## 2023-09-19 PROCEDURE — 1160F RVW MEDS BY RX/DR IN RCRD: CPT | Mod: CPTII,S$GLB,, | Performed by: INTERNAL MEDICINE

## 2023-09-19 PROCEDURE — 3077F PR MOST RECENT SYSTOLIC BLOOD PRESSURE >= 140 MM HG: ICD-10-PCS | Mod: CPTII,S$GLB,, | Performed by: INTERNAL MEDICINE

## 2023-09-19 PROCEDURE — 3008F PR BODY MASS INDEX (BMI) DOCUMENTED: ICD-10-PCS | Mod: CPTII,S$GLB,, | Performed by: INTERNAL MEDICINE

## 2023-09-19 PROCEDURE — 3080F PR MOST RECENT DIASTOLIC BLOOD PRESSURE >= 90 MM HG: ICD-10-PCS | Mod: CPTII,S$GLB,, | Performed by: INTERNAL MEDICINE

## 2023-09-19 PROCEDURE — 3008F BODY MASS INDEX DOCD: CPT | Mod: CPTII,S$GLB,, | Performed by: INTERNAL MEDICINE

## 2023-09-19 PROCEDURE — 1159F MED LIST DOCD IN RCRD: CPT | Mod: CPTII,S$GLB,, | Performed by: INTERNAL MEDICINE

## 2023-09-19 PROCEDURE — 99999 PR PBB SHADOW E&M-EST. PATIENT-LVL IV: CPT | Mod: PBBFAC,,, | Performed by: INTERNAL MEDICINE

## 2023-09-19 PROCEDURE — 3080F DIAST BP >= 90 MM HG: CPT | Mod: CPTII,S$GLB,, | Performed by: INTERNAL MEDICINE

## 2023-09-19 PROCEDURE — 99999 PR PBB SHADOW E&M-EST. PATIENT-LVL IV: ICD-10-PCS | Mod: PBBFAC,,, | Performed by: INTERNAL MEDICINE

## 2023-09-19 PROCEDURE — 1160F PR REVIEW ALL MEDS BY PRESCRIBER/CLIN PHARMACIST DOCUMENTED: ICD-10-PCS | Mod: CPTII,S$GLB,, | Performed by: INTERNAL MEDICINE

## 2023-09-19 PROCEDURE — 99214 OFFICE O/P EST MOD 30 MIN: CPT | Mod: S$GLB,,, | Performed by: INTERNAL MEDICINE

## 2023-09-19 RX ORDER — AMLODIPINE BESYLATE 5 MG/1
5 TABLET ORAL DAILY
Qty: 90 TABLET | Refills: 3 | Status: SHIPPED | OUTPATIENT
Start: 2023-09-19

## 2023-09-19 NOTE — PROGRESS NOTES
Ruma Keane  38 y.o. Black or  female  [id number]    Chief Complaint:  Chief Complaint   Patient presents with    Follow-up       History of Present Illness:  HTN--elevated. She took her medication when she arrived to the clinic. She checks it on occasion at home and it is usually in a good range.   Insomnia--stable on as needed trazodone.   Obesity--trying to lose weight.     Laboratory:  Lab Results   Component Value Date    WBC 6.99 04/14/2022    HGB 12.2 04/14/2022    HCT 37.8 04/14/2022     04/14/2022    CHOL 166 03/19/2021    TRIG 58 03/19/2021    HDL 41 03/19/2021    ALT 13 03/19/2021    AST 14 03/19/2021     03/19/2021    K 4.0 03/19/2021     03/19/2021    CREATININE 0.8 03/19/2021    BUN 14 03/19/2021    CO2 24 03/19/2021    TSH 1.891 03/19/2021    INR 1.1 11/21/2019    HGBA1C 5.1 03/19/2021     Lab Results   Component Value Date    LDLCALC 113.4 03/19/2021       History:  Past Medical History:   Diagnosis Date    BRCA gene mutation negative     Helicobacter pylori (H. pylori) infection     Hepatitis C antibody positive in blood 01/06/2021    RNA NEGATIVE    Hepatitis C virus infection cured after antiviral drug therapy 01/2022    Hypertension     LGSIL on Pap smear of cervix 03/03/2022    Plan- Colpo(3/22/22- LGSIL/BOY-I) // 8/31/22- Repeat pap Neg// 7/10/23- Pap//    Migraines        Medications:  Current Outpatient Medications on File Prior to Visit   Medication Sig Dispense Refill    DULCOLAX, BISACODYL, ORAL Take by mouth.      hydroCHLOROthiazide (HYDRODIURIL) 25 MG tablet Take 1 tablet (25 mg total) by mouth once daily. 90 tablet 1    traZODone (DESYREL) 50 MG tablet Take 1 tablet (50 mg total) by mouth every evening. 30 tablet 1    VIENVA 0.1-20 mg-mcg per tablet TAKE 1 TABLET BY MOUTH EVERY DAY 28 tablet 5    amLODIPine (NORVASC) 5 MG tablet TAKE 1 TABLET(5 MG) BY MOUTH EVERY DAY 90 tablet 0     Allergies:  Review of patient's allergies indicates:   Allergen  Reactions    Bactrim [sulfamethoxazole-trimethoprim] Swelling       Review of Systems   Respiratory:  Negative for shortness of breath.    Cardiovascular:  Negative for chest pain and leg swelling.   Neurological:  Positive for headaches.       Exam:  Vitals:    09/19/23 0914   BP: (!) 140/100   Pulse:    Resp:    Temp:      Weight: 85.6 kg (188 lb 11.4 oz)   Body mass index is 31.4 kg/m².      Physical Exam  Vitals reviewed.   Constitutional:       General: She is not in acute distress.     Appearance: She is well-developed. She is not ill-appearing.   Eyes:      General: No scleral icterus.  Cardiovascular:      Rate and Rhythm: Normal rate and regular rhythm.      Heart sounds: Normal heart sounds.   Pulmonary:      Effort: Pulmonary effort is normal. No respiratory distress.      Breath sounds: Normal breath sounds.   Musculoskeletal:      Right lower leg: No edema.      Left lower leg: No edema.   Skin:     General: Skin is warm and dry.   Neurological:      Mental Status: She is alert and oriented to person, place, and time.   Psychiatric:         Behavior: Behavior normal.       Assessment:  The primary encounter diagnosis was Essential hypertension. Diagnoses of Insomnia, unspecified type and Obesity (BMI 30.0-34.9) were also pertinent to this visit.    Plan:  Essential hypertension  -     refill amLODIPine (NORVASC) 5 MG tablet; Take 1 tablet (5 mg total) by mouth once daily.  Dispense: 90 tablet; Refill: 3  -     continue HCTZ   -     Lifestyle modifications discussed    Insomnia, unspecified type  -     continue trazodone     Obesity (BMI 30.0-34.9)  -     Lifestyle modifications discussed    Follow up in about 4 weeks (around 10/17/2023).

## 2023-09-22 ENCOUNTER — PATIENT MESSAGE (OUTPATIENT)
Dept: INTERNAL MEDICINE | Facility: CLINIC | Age: 38
End: 2023-09-22

## 2023-09-22 ENCOUNTER — OFFICE VISIT (OUTPATIENT)
Dept: INTERNAL MEDICINE | Facility: CLINIC | Age: 38
End: 2023-09-22
Payer: COMMERCIAL

## 2023-09-22 DIAGNOSIS — Z02.89 ENCOUNTER FOR COMPLETION OF FORM WITH PATIENT: Primary | ICD-10-CM

## 2023-09-22 DIAGNOSIS — Z86.59 HISTORY OF DEPRESSION: ICD-10-CM

## 2023-09-22 PROCEDURE — 1159F PR MEDICATION LIST DOCUMENTED IN MEDICAL RECORD: ICD-10-PCS | Mod: CPTII,95,, | Performed by: INTERNAL MEDICINE

## 2023-09-22 PROCEDURE — 1159F MED LIST DOCD IN RCRD: CPT | Mod: CPTII,95,, | Performed by: INTERNAL MEDICINE

## 2023-09-22 PROCEDURE — 1160F PR REVIEW ALL MEDS BY PRESCRIBER/CLIN PHARMACIST DOCUMENTED: ICD-10-PCS | Mod: CPTII,95,, | Performed by: INTERNAL MEDICINE

## 2023-09-22 PROCEDURE — 99213 OFFICE O/P EST LOW 20 MIN: CPT | Mod: 95,,, | Performed by: INTERNAL MEDICINE

## 2023-09-22 PROCEDURE — 1160F RVW MEDS BY RX/DR IN RCRD: CPT | Mod: CPTII,95,, | Performed by: INTERNAL MEDICINE

## 2023-09-22 PROCEDURE — 99213 PR OFFICE/OUTPT VISIT, EST, LEVL III, 20-29 MIN: ICD-10-PCS | Mod: 95,,, | Performed by: INTERNAL MEDICINE

## 2023-09-22 NOTE — PROGRESS NOTES
Subjective     Patient ID: Ruma Keane is a 38 y.o. female.    Chief Complaint: Paperwork completion    The patient location is: [place]   The chief complaint leading to consultation is: form completion     Visit type: audiovisual    Face to Face time with patient: 16 minutes   16 minutes of total time spent on the encounter, which includes face to face time and non-face to face time preparing to see the patient (eg, review of tests), Obtaining and/or reviewing separately obtained history, Documenting clinical information in the electronic or other health record, Independently interpreting results (not separately reported) and communicating results to the patient/family/caregiver, or Care coordination (not separately reported).         Each patient to whom he or she provides medical services by telemedicine is:  (1) informed of the relationship between the physician and patient and the respective role of any other health care provider with respect to management of the patient; and (2) notified that he or she may decline to receive medical services by telemedicine and may withdraw from such care at any time.    Notes:   Patient  has form for a concealed handgun permit. She has been treated for depression in the past but cannot give any dates of treatment or who treated her.         Review of Systems   Constitutional:  Negative for activity change and unexpected weight change.   HENT:  Negative for hearing loss, rhinorrhea and trouble swallowing.    Eyes:  Negative for discharge and visual disturbance.   Respiratory:  Negative for chest tightness and wheezing.    Cardiovascular:  Negative for chest pain and palpitations.   Gastrointestinal:  Negative for blood in stool, constipation, diarrhea and vomiting.   Endocrine: Negative for polydipsia and polyuria.   Genitourinary:  Negative for difficulty urinating, dysuria, hematuria and menstrual problem.   Musculoskeletal:  Negative for arthralgias, joint  swelling and neck pain.   Neurological:  Negative for weakness and headaches.   Psychiatric/Behavioral:  Negative for confusion and dysphoric mood.           Objective     Physical Exam  Constitutional:       General: She is not in acute distress.     Appearance: She is well-developed. She is not ill-appearing.   Pulmonary:      Effort: Pulmonary effort is normal. No respiratory distress.   Neurological:      Mental Status: She is alert and oriented to person, place, and time.   Psychiatric:         Behavior: Behavior normal.         Thought Content: Thought content normal.         Judgment: Judgment normal.       Ruma was seen today for paperwork completion.    Diagnoses and all orders for this visit:    Encounter for completion of form with patient    History of depression  -     Ambulatory referral/consult to Psychology; Future    RTC as needed.

## 2023-10-16 ENCOUNTER — OFFICE VISIT (OUTPATIENT)
Dept: INTERNAL MEDICINE | Facility: CLINIC | Age: 38
End: 2023-10-16
Payer: COMMERCIAL

## 2023-10-16 ENCOUNTER — LAB VISIT (OUTPATIENT)
Dept: LAB | Facility: HOSPITAL | Age: 38
End: 2023-10-16
Attending: PHYSICIAN ASSISTANT
Payer: COMMERCIAL

## 2023-10-16 VITALS
HEIGHT: 65 IN | DIASTOLIC BLOOD PRESSURE: 88 MMHG | OXYGEN SATURATION: 98 % | SYSTOLIC BLOOD PRESSURE: 124 MMHG | BODY MASS INDEX: 32.1 KG/M2 | HEART RATE: 95 BPM | WEIGHT: 192.69 LBS | TEMPERATURE: 97 F

## 2023-10-16 DIAGNOSIS — Z00.00 PREVENTATIVE HEALTH CARE: ICD-10-CM

## 2023-10-16 DIAGNOSIS — I10 ESSENTIAL HYPERTENSION: ICD-10-CM

## 2023-10-16 DIAGNOSIS — I10 ESSENTIAL HYPERTENSION: Primary | ICD-10-CM

## 2023-10-16 LAB
ALBUMIN SERPL BCP-MCNC: 3.5 G/DL (ref 3.5–5.2)
ALP SERPL-CCNC: 70 U/L (ref 55–135)
ALT SERPL W/O P-5'-P-CCNC: 24 U/L (ref 10–44)
ANION GAP SERPL CALC-SCNC: 7 MMOL/L (ref 8–16)
AST SERPL-CCNC: 19 U/L (ref 10–40)
BASOPHILS # BLD AUTO: 0.02 K/UL (ref 0–0.2)
BASOPHILS NFR BLD: 0.3 % (ref 0–1.9)
BILIRUB SERPL-MCNC: 0.6 MG/DL (ref 0.1–1)
BUN SERPL-MCNC: 10 MG/DL (ref 6–20)
CALCIUM SERPL-MCNC: 9.1 MG/DL (ref 8.7–10.5)
CHLORIDE SERPL-SCNC: 110 MMOL/L (ref 95–110)
CHOLEST SERPL-MCNC: 154 MG/DL (ref 120–199)
CHOLEST/HDLC SERPL: 3.9 {RATIO} (ref 2–5)
CO2 SERPL-SCNC: 24 MMOL/L (ref 23–29)
CREAT SERPL-MCNC: 0.8 MG/DL (ref 0.5–1.4)
DIFFERENTIAL METHOD: ABNORMAL
EOSINOPHIL # BLD AUTO: 0.1 K/UL (ref 0–0.5)
EOSINOPHIL NFR BLD: 1.1 % (ref 0–8)
ERYTHROCYTE [DISTWIDTH] IN BLOOD BY AUTOMATED COUNT: 14.4 % (ref 11.5–14.5)
EST. GFR  (NO RACE VARIABLE): >60 ML/MIN/1.73 M^2
ESTIMATED AVG GLUCOSE: 103 MG/DL (ref 68–131)
GLUCOSE SERPL-MCNC: 90 MG/DL (ref 70–110)
HBA1C MFR BLD: 5.2 % (ref 4–5.6)
HCT VFR BLD AUTO: 39.4 % (ref 37–48.5)
HDLC SERPL-MCNC: 39 MG/DL (ref 40–75)
HDLC SERPL: 25.3 % (ref 20–50)
HGB BLD-MCNC: 12.3 G/DL (ref 12–16)
IMM GRANULOCYTES # BLD AUTO: 0.03 K/UL (ref 0–0.04)
IMM GRANULOCYTES NFR BLD AUTO: 0.4 % (ref 0–0.5)
LDLC SERPL CALC-MCNC: 103.4 MG/DL (ref 63–159)
LYMPHOCYTES # BLD AUTO: 2 K/UL (ref 1–4.8)
LYMPHOCYTES NFR BLD: 27.9 % (ref 18–48)
MCH RBC QN AUTO: 27.3 PG (ref 27–31)
MCHC RBC AUTO-ENTMCNC: 31.2 G/DL (ref 32–36)
MCV RBC AUTO: 88 FL (ref 82–98)
MONOCYTES # BLD AUTO: 0.6 K/UL (ref 0.3–1)
MONOCYTES NFR BLD: 8.4 % (ref 4–15)
NEUTROPHILS # BLD AUTO: 4.5 K/UL (ref 1.8–7.7)
NEUTROPHILS NFR BLD: 61.9 % (ref 38–73)
NONHDLC SERPL-MCNC: 115 MG/DL
NRBC BLD-RTO: 0 /100 WBC
PLATELET # BLD AUTO: 279 K/UL (ref 150–450)
PMV BLD AUTO: 11.5 FL (ref 9.2–12.9)
POTASSIUM SERPL-SCNC: 3.6 MMOL/L (ref 3.5–5.1)
PROT SERPL-MCNC: 7.8 G/DL (ref 6–8.4)
RBC # BLD AUTO: 4.5 M/UL (ref 4–5.4)
SODIUM SERPL-SCNC: 141 MMOL/L (ref 136–145)
TRIGL SERPL-MCNC: 58 MG/DL (ref 30–150)
WBC # BLD AUTO: 7.28 K/UL (ref 3.9–12.7)

## 2023-10-16 PROCEDURE — 1159F PR MEDICATION LIST DOCUMENTED IN MEDICAL RECORD: ICD-10-PCS | Mod: CPTII,S$GLB,, | Performed by: PHYSICIAN ASSISTANT

## 2023-10-16 PROCEDURE — 99214 PR OFFICE/OUTPT VISIT, EST, LEVL IV, 30-39 MIN: ICD-10-PCS | Mod: 25,S$GLB,, | Performed by: PHYSICIAN ASSISTANT

## 2023-10-16 PROCEDURE — 90471 FLU VACCINE (QUAD) GREATER THAN OR EQUAL TO 3YO PRESERVATIVE FREE IM: ICD-10-PCS | Mod: S$GLB,,, | Performed by: PHYSICIAN ASSISTANT

## 2023-10-16 PROCEDURE — 3079F PR MOST RECENT DIASTOLIC BLOOD PRESSURE 80-89 MM HG: ICD-10-PCS | Mod: CPTII,S$GLB,, | Performed by: PHYSICIAN ASSISTANT

## 2023-10-16 PROCEDURE — 90686 IIV4 VACC NO PRSV 0.5 ML IM: CPT | Mod: S$GLB,,, | Performed by: PHYSICIAN ASSISTANT

## 2023-10-16 PROCEDURE — 90686 FLU VACCINE (QUAD) GREATER THAN OR EQUAL TO 3YO PRESERVATIVE FREE IM: ICD-10-PCS | Mod: S$GLB,,, | Performed by: PHYSICIAN ASSISTANT

## 2023-10-16 PROCEDURE — 99999 PR PBB SHADOW E&M-EST. PATIENT-LVL III: ICD-10-PCS | Mod: PBBFAC,,, | Performed by: PHYSICIAN ASSISTANT

## 2023-10-16 PROCEDURE — 1159F MED LIST DOCD IN RCRD: CPT | Mod: CPTII,S$GLB,, | Performed by: PHYSICIAN ASSISTANT

## 2023-10-16 PROCEDURE — 90471 IMMUNIZATION ADMIN: CPT | Mod: S$GLB,,, | Performed by: PHYSICIAN ASSISTANT

## 2023-10-16 PROCEDURE — 3008F PR BODY MASS INDEX (BMI) DOCUMENTED: ICD-10-PCS | Mod: CPTII,S$GLB,, | Performed by: PHYSICIAN ASSISTANT

## 2023-10-16 PROCEDURE — 3074F SYST BP LT 130 MM HG: CPT | Mod: CPTII,S$GLB,, | Performed by: PHYSICIAN ASSISTANT

## 2023-10-16 PROCEDURE — 80061 LIPID PANEL: CPT | Performed by: PHYSICIAN ASSISTANT

## 2023-10-16 PROCEDURE — 3008F BODY MASS INDEX DOCD: CPT | Mod: CPTII,S$GLB,, | Performed by: PHYSICIAN ASSISTANT

## 2023-10-16 PROCEDURE — 3074F PR MOST RECENT SYSTOLIC BLOOD PRESSURE < 130 MM HG: ICD-10-PCS | Mod: CPTII,S$GLB,, | Performed by: PHYSICIAN ASSISTANT

## 2023-10-16 PROCEDURE — 99999 PR PBB SHADOW E&M-EST. PATIENT-LVL III: CPT | Mod: PBBFAC,,, | Performed by: PHYSICIAN ASSISTANT

## 2023-10-16 PROCEDURE — 99214 OFFICE O/P EST MOD 30 MIN: CPT | Mod: 25,S$GLB,, | Performed by: PHYSICIAN ASSISTANT

## 2023-10-16 PROCEDURE — 85025 COMPLETE CBC W/AUTO DIFF WBC: CPT | Performed by: PHYSICIAN ASSISTANT

## 2023-10-16 PROCEDURE — 36415 COLL VENOUS BLD VENIPUNCTURE: CPT | Performed by: PHYSICIAN ASSISTANT

## 2023-10-16 PROCEDURE — 80053 COMPREHEN METABOLIC PANEL: CPT | Performed by: PHYSICIAN ASSISTANT

## 2023-10-16 PROCEDURE — 83036 HEMOGLOBIN GLYCOSYLATED A1C: CPT | Performed by: PHYSICIAN ASSISTANT

## 2023-10-16 PROCEDURE — 3079F DIAST BP 80-89 MM HG: CPT | Mod: CPTII,S$GLB,, | Performed by: PHYSICIAN ASSISTANT

## 2023-10-16 NOTE — PROGRESS NOTES
"Subjective:      Patient ID: Ruma Keane is a 38 y.o. female.    Chief Complaint: Follow-up (HTN f/u)    Patient is new to me, being seen today for BP f/u.     HTN- amlodipine 5mg, HCTZ 12.5mg   Reports BP is elevated at home at times    Due for labs     Last visit Sept 2023 with PCP, BP elevated at that time.       Review of Systems   Constitutional:  Negative for chills, diaphoresis and fever.   HENT:  Negative for congestion, rhinorrhea and sore throat.    Respiratory:  Negative for cough, shortness of breath and wheezing.    Cardiovascular:  Negative for chest pain and leg swelling.   Gastrointestinal:  Negative for abdominal pain, constipation, diarrhea, nausea and vomiting.   Skin:  Negative for rash.   Neurological:  Negative for dizziness, light-headedness and headaches.       Objective:   /88   Pulse 95   Temp 97.1 °F (36.2 °C)   Ht 5' 5" (1.651 m)   Wt 87.4 kg (192 lb 10.9 oz)   LMP 09/06/2023 (Exact Date)   SpO2 98%   BMI 32.06 kg/m²   Physical Exam  Constitutional:       General: She is not in acute distress.     Appearance: Normal appearance. She is well-developed. She is not ill-appearing.   HENT:      Head: Normocephalic and atraumatic.   Cardiovascular:      Rate and Rhythm: Normal rate and regular rhythm.      Heart sounds: Normal heart sounds. No murmur heard.  Pulmonary:      Effort: Pulmonary effort is normal. No respiratory distress.      Breath sounds: Normal breath sounds. No decreased breath sounds.   Musculoskeletal:      Right lower leg: No edema.      Left lower leg: No edema.   Skin:     General: Skin is warm and dry.      Findings: No rash.   Psychiatric:         Speech: Speech normal.         Behavior: Behavior normal.         Thought Content: Thought content normal.       Assessment:      1. Essential hypertension    2. Preventative health care       Plan:   Essential hypertension  -     CBC Auto Differential; Future; Expected date: 10/16/2023  -     Comprehensive " Metabolic Panel; Future; Expected date: 10/16/2023  -     Lipid Panel; Future; Expected date: 10/16/2023    Preventative health care  -     Hemoglobin A1C; Future; Expected date: 10/16/2023    Other orders  -     Influenza - Quadrivalent (PF)      Fasting labs     2wk nurse visit for cuff calibration, monitor at home, bring cuff and log to visit     6mth f/u PCP

## 2023-11-03 ENCOUNTER — OFFICE VISIT (OUTPATIENT)
Dept: INTERNAL MEDICINE | Facility: CLINIC | Age: 38
End: 2023-11-03
Payer: COMMERCIAL

## 2023-11-03 VITALS
SYSTOLIC BLOOD PRESSURE: 120 MMHG | WEIGHT: 191.13 LBS | HEIGHT: 65 IN | OXYGEN SATURATION: 99 % | BODY MASS INDEX: 31.85 KG/M2 | TEMPERATURE: 98 F | DIASTOLIC BLOOD PRESSURE: 88 MMHG | RESPIRATION RATE: 18 BRPM | HEART RATE: 99 BPM

## 2023-11-03 DIAGNOSIS — J02.9 SORE THROAT: ICD-10-CM

## 2023-11-03 DIAGNOSIS — J06.9 VIRAL URI: Primary | ICD-10-CM

## 2023-11-03 DIAGNOSIS — I10 ESSENTIAL HYPERTENSION: ICD-10-CM

## 2023-11-03 DIAGNOSIS — R35.0 URINARY FREQUENCY: ICD-10-CM

## 2023-11-03 LAB
BACTERIA #/AREA URNS HPF: ABNORMAL /HPF
BILIRUB UR QL STRIP: NEGATIVE
CLARITY UR: ABNORMAL
COLOR UR: YELLOW
CTP QC/QA: YES
CTP QC/QA: YES
GLUCOSE UR QL STRIP: NEGATIVE
GROUP A STREP, MOLECULAR: NEGATIVE
HGB UR QL STRIP: NEGATIVE
HYALINE CASTS #/AREA URNS LPF: ABNORMAL /LPF
KETONES UR QL STRIP: NEGATIVE
LEUKOCYTE ESTERASE UR QL STRIP: ABNORMAL
MICROSCOPIC COMMENT: ABNORMAL
NITRITE UR QL STRIP: NEGATIVE
NON-SQ EPI CELLS #/AREA URNS HPF: <1 /HPF
PH UR STRIP: 7 [PH] (ref 5–8)
POC MOLECULAR INFLUENZA A AGN: NEGATIVE
POC MOLECULAR INFLUENZA B AGN: NEGATIVE
PROT UR QL STRIP: ABNORMAL
RBC #/AREA URNS HPF: 1 /HPF (ref 0–4)
SARS-COV-2 RDRP RESP QL NAA+PROBE: NEGATIVE
SP GR UR STRIP: 1.01 (ref 1–1.03)
SQUAMOUS #/AREA URNS HPF: 12 /HPF
URN SPEC COLLECT METH UR: ABNORMAL
WBC #/AREA URNS HPF: 17 /HPF (ref 0–5)

## 2023-11-03 PROCEDURE — 1159F PR MEDICATION LIST DOCUMENTED IN MEDICAL RECORD: ICD-10-PCS | Mod: CPTII,S$GLB,, | Performed by: PHYSICIAN ASSISTANT

## 2023-11-03 PROCEDURE — 1160F RVW MEDS BY RX/DR IN RCRD: CPT | Mod: CPTII,S$GLB,, | Performed by: PHYSICIAN ASSISTANT

## 2023-11-03 PROCEDURE — 87502 INFLUENZA DNA AMP PROBE: CPT | Mod: QW,S$GLB,, | Performed by: PHYSICIAN ASSISTANT

## 2023-11-03 PROCEDURE — 3044F HG A1C LEVEL LT 7.0%: CPT | Mod: CPTII,S$GLB,, | Performed by: PHYSICIAN ASSISTANT

## 2023-11-03 PROCEDURE — 3044F PR MOST RECENT HEMOGLOBIN A1C LEVEL <7.0%: ICD-10-PCS | Mod: CPTII,S$GLB,, | Performed by: PHYSICIAN ASSISTANT

## 2023-11-03 PROCEDURE — 87651 STREP A DNA AMP PROBE: CPT | Performed by: PHYSICIAN ASSISTANT

## 2023-11-03 PROCEDURE — 87635: ICD-10-PCS | Mod: QW,S$GLB,, | Performed by: PHYSICIAN ASSISTANT

## 2023-11-03 PROCEDURE — 87635 SARS-COV-2 COVID-19 AMP PRB: CPT | Mod: QW,S$GLB,, | Performed by: PHYSICIAN ASSISTANT

## 2023-11-03 PROCEDURE — 3008F BODY MASS INDEX DOCD: CPT | Mod: CPTII,S$GLB,, | Performed by: PHYSICIAN ASSISTANT

## 2023-11-03 PROCEDURE — 1160F PR REVIEW ALL MEDS BY PRESCRIBER/CLIN PHARMACIST DOCUMENTED: ICD-10-PCS | Mod: CPTII,S$GLB,, | Performed by: PHYSICIAN ASSISTANT

## 2023-11-03 PROCEDURE — 87502 POCT INFLUENZA A/B MOLECULAR: ICD-10-PCS | Mod: QW,S$GLB,, | Performed by: PHYSICIAN ASSISTANT

## 2023-11-03 PROCEDURE — 99214 PR OFFICE/OUTPT VISIT, EST, LEVL IV, 30-39 MIN: ICD-10-PCS | Mod: S$GLB,,, | Performed by: PHYSICIAN ASSISTANT

## 2023-11-03 PROCEDURE — 3079F DIAST BP 80-89 MM HG: CPT | Mod: CPTII,S$GLB,, | Performed by: PHYSICIAN ASSISTANT

## 2023-11-03 PROCEDURE — 99999 PR PBB SHADOW E&M-EST. PATIENT-LVL IV: ICD-10-PCS | Mod: PBBFAC,,, | Performed by: PHYSICIAN ASSISTANT

## 2023-11-03 PROCEDURE — 3079F PR MOST RECENT DIASTOLIC BLOOD PRESSURE 80-89 MM HG: ICD-10-PCS | Mod: CPTII,S$GLB,, | Performed by: PHYSICIAN ASSISTANT

## 2023-11-03 PROCEDURE — 3074F SYST BP LT 130 MM HG: CPT | Mod: CPTII,S$GLB,, | Performed by: PHYSICIAN ASSISTANT

## 2023-11-03 PROCEDURE — 99214 OFFICE O/P EST MOD 30 MIN: CPT | Mod: S$GLB,,, | Performed by: PHYSICIAN ASSISTANT

## 2023-11-03 PROCEDURE — 99999 PR PBB SHADOW E&M-EST. PATIENT-LVL IV: CPT | Mod: PBBFAC,,, | Performed by: PHYSICIAN ASSISTANT

## 2023-11-03 PROCEDURE — 1159F MED LIST DOCD IN RCRD: CPT | Mod: CPTII,S$GLB,, | Performed by: PHYSICIAN ASSISTANT

## 2023-11-03 PROCEDURE — 87086 URINE CULTURE/COLONY COUNT: CPT | Performed by: PHYSICIAN ASSISTANT

## 2023-11-03 PROCEDURE — 3074F PR MOST RECENT SYSTOLIC BLOOD PRESSURE < 130 MM HG: ICD-10-PCS | Mod: CPTII,S$GLB,, | Performed by: PHYSICIAN ASSISTANT

## 2023-11-03 PROCEDURE — 81000 URINALYSIS NONAUTO W/SCOPE: CPT | Performed by: PHYSICIAN ASSISTANT

## 2023-11-03 PROCEDURE — 3008F PR BODY MASS INDEX (BMI) DOCUMENTED: ICD-10-PCS | Mod: CPTII,S$GLB,, | Performed by: PHYSICIAN ASSISTANT

## 2023-11-03 NOTE — PROGRESS NOTES
"Subjective:      Patient ID: Ruma Keane is a 38 y.o. female.    Chief Complaint: Sore Throat, Nasal Congestion, and Ear Fullness    Patient is known to me, being seen today for URI symptoms.   Took zyrtec with some relief     Last visit Sept 2023 with myself.     Sore Throat   This is a new problem. The current episode started today. The problem has been gradually worsening. Neither side of throat is experiencing more pain than the other. There has been no fever. The pain is at a severity of 8/10. The pain is mild. Associated symptoms include congestion, ear pain (bilateral) and a plugged ear sensation. Pertinent negatives include no abdominal pain, coughing, diarrhea, drooling, ear discharge, headaches, hoarse voice, neck pain, shortness of breath, stridor, swollen glands, trouble swallowing or vomiting. She has had no exposure to strep or mono. She has tried acetaminophen (warm liquids) for the symptoms. The treatment provided no relief.     Review of Systems   Constitutional:  Positive for chills. Negative for diaphoresis and fever.   HENT:  Positive for congestion, ear pain (bilateral), rhinorrhea and sore throat. Negative for drooling, ear discharge, hoarse voice and trouble swallowing.    Respiratory:  Negative for cough, shortness of breath, wheezing and stridor.    Gastrointestinal:  Negative for abdominal pain, constipation, diarrhea, nausea and vomiting.   Genitourinary:  Positive for frequency (x1wk). Negative for dysuria, flank pain and hematuria.   Musculoskeletal:  Negative for back pain and neck pain.   Skin:  Negative for rash.   Neurological:  Negative for dizziness, light-headedness and headaches.       Objective:   /88   Pulse 99   Temp 98.2 °F (36.8 °C) (Tympanic)   Resp 18   Ht 5' 5" (1.651 m)   Wt 86.7 kg (191 lb 2.2 oz)   SpO2 99%   BMI 31.81 kg/m²   Physical Exam  Constitutional:       General: She is not in acute distress.     Appearance: Normal appearance. She is " well-developed. She is not ill-appearing or diaphoretic.   HENT:      Head: Normocephalic and atraumatic.      Right Ear: Tympanic membrane and ear canal normal. No middle ear effusion. Tympanic membrane is not erythematous.      Left Ear: Tympanic membrane and ear canal normal.  No middle ear effusion. Tympanic membrane is not erythematous.      Ears:      Comments: Moderate cerumen L ear canal      Nose: Mucosal edema present.      Mouth/Throat:      Pharynx: Uvula midline. No posterior oropharyngeal erythema.      Comments: Signs of PND   Cardiovascular:      Rate and Rhythm: Normal rate and regular rhythm.      Heart sounds: Normal heart sounds. No murmur heard.  Pulmonary:      Effort: Pulmonary effort is normal. No respiratory distress.      Breath sounds: Normal breath sounds. No decreased breath sounds, wheezing, rhonchi or rales.   Abdominal:      General: Bowel sounds are normal.      Palpations: Abdomen is soft.      Tenderness: There is no abdominal tenderness. There is no right CVA tenderness or left CVA tenderness.   Musculoskeletal:      Right lower leg: No edema.      Left lower leg: No edema.   Lymphadenopathy:      Cervical: No cervical adenopathy.   Skin:     General: Skin is warm and dry.      Findings: No rash.   Psychiatric:         Speech: Speech normal.         Behavior: Behavior normal.         Thought Content: Thought content normal.       Assessment:      1. Viral URI    2. Sore throat    3. Urinary frequency    4. Essential hypertension       Plan:   Viral URI    Sore throat  -     POCT COVID-19 Rapid Screening  -     POCT Influenza A/B Molecular  -     Group A Strep, Molecular    Urinary frequency  -     Urinalysis; Future; Expected date: 11/03/2023  -     Urine culture; Future; Expected date: 11/03/2023    Essential hypertension   Controlled     Debrox for excess cerumen     Advised patient based on time frame and symptomology this is likely a viral upper respiratory infection.  It does  not require antibiotics at this time.    Will treat symptoms with OTC meds.  If no improvement, or if condition worsens, follow up

## 2023-11-05 LAB — BACTERIA UR CULT: NORMAL

## 2023-12-26 ENCOUNTER — OFFICE VISIT (OUTPATIENT)
Dept: INTERNAL MEDICINE | Facility: CLINIC | Age: 38
End: 2023-12-26
Payer: COMMERCIAL

## 2023-12-26 VITALS
OXYGEN SATURATION: 96 % | HEIGHT: 65 IN | HEART RATE: 83 BPM | WEIGHT: 196.19 LBS | TEMPERATURE: 98 F | SYSTOLIC BLOOD PRESSURE: 132 MMHG | BODY MASS INDEX: 32.69 KG/M2 | DIASTOLIC BLOOD PRESSURE: 88 MMHG

## 2023-12-26 DIAGNOSIS — J06.9 URI WITH COUGH AND CONGESTION: Primary | ICD-10-CM

## 2023-12-26 DIAGNOSIS — E66.9 OBESITY (BMI 30.0-34.9): ICD-10-CM

## 2023-12-26 DIAGNOSIS — I10 ESSENTIAL HYPERTENSION: ICD-10-CM

## 2023-12-26 PROCEDURE — 3075F PR MOST RECENT SYSTOLIC BLOOD PRESS GE 130-139MM HG: ICD-10-PCS | Mod: CPTII,S$GLB,,

## 2023-12-26 PROCEDURE — 3044F HG A1C LEVEL LT 7.0%: CPT | Mod: CPTII,S$GLB,,

## 2023-12-26 PROCEDURE — 99213 OFFICE O/P EST LOW 20 MIN: CPT | Mod: S$GLB,,,

## 2023-12-26 PROCEDURE — 3075F SYST BP GE 130 - 139MM HG: CPT | Mod: CPTII,S$GLB,,

## 2023-12-26 PROCEDURE — 99999 PR PBB SHADOW E&M-EST. PATIENT-LVL III: CPT | Mod: PBBFAC,,,

## 2023-12-26 PROCEDURE — 1160F PR REVIEW ALL MEDS BY PRESCRIBER/CLIN PHARMACIST DOCUMENTED: ICD-10-PCS | Mod: CPTII,S$GLB,,

## 2023-12-26 PROCEDURE — 99999 PR PBB SHADOW E&M-EST. PATIENT-LVL III: ICD-10-PCS | Mod: PBBFAC,,,

## 2023-12-26 PROCEDURE — 3079F PR MOST RECENT DIASTOLIC BLOOD PRESSURE 80-89 MM HG: ICD-10-PCS | Mod: CPTII,S$GLB,,

## 2023-12-26 PROCEDURE — 3079F DIAST BP 80-89 MM HG: CPT | Mod: CPTII,S$GLB,,

## 2023-12-26 PROCEDURE — 1160F RVW MEDS BY RX/DR IN RCRD: CPT | Mod: CPTII,S$GLB,,

## 2023-12-26 PROCEDURE — 3008F PR BODY MASS INDEX (BMI) DOCUMENTED: ICD-10-PCS | Mod: CPTII,S$GLB,,

## 2023-12-26 PROCEDURE — 99213 PR OFFICE/OUTPT VISIT, EST, LEVL III, 20-29 MIN: ICD-10-PCS | Mod: S$GLB,,,

## 2023-12-26 PROCEDURE — 3008F BODY MASS INDEX DOCD: CPT | Mod: CPTII,S$GLB,,

## 2023-12-26 PROCEDURE — 1159F PR MEDICATION LIST DOCUMENTED IN MEDICAL RECORD: ICD-10-PCS | Mod: CPTII,S$GLB,,

## 2023-12-26 PROCEDURE — 1159F MED LIST DOCD IN RCRD: CPT | Mod: CPTII,S$GLB,,

## 2023-12-26 PROCEDURE — 3044F PR MOST RECENT HEMOGLOBIN A1C LEVEL <7.0%: ICD-10-PCS | Mod: CPTII,S$GLB,,

## 2023-12-26 RX ORDER — METHYLPREDNISOLONE 4 MG/1
TABLET ORAL
Qty: 21 EACH | Refills: 0 | Status: SHIPPED | OUTPATIENT
Start: 2023-12-26

## 2023-12-26 RX ORDER — PROMETHAZINE HYDROCHLORIDE AND DEXTROMETHORPHAN HYDROBROMIDE 6.25; 15 MG/5ML; MG/5ML
5 SYRUP ORAL EVERY 6 HOURS PRN
Qty: 180 ML | Refills: 0 | Status: SHIPPED | OUTPATIENT
Start: 2023-12-26 | End: 2024-01-05

## 2023-12-26 NOTE — PROGRESS NOTES
Ruma Keane  12/26/2023  [id number]    Sherrie Cristina DO  Patient Care Team:  Sherrie Cristina DO as PCP - General (Internal Medicine)  Corby Cowan MD as Obstetrician (Obstetrics and Gynecology)          Visit Type:an urgent visit for an existing problem     Chief Complaint:  Chief Complaint   Patient presents with    Cough    Nasal Congestion        History of Present Illness:    Cough and nasal congestion started 4-5 days ago  She was exposed to the flu   She had body aches  Negative for fever or chills     Symptoms have improved  Using OTC dayquil and nyquil       History:  Past Medical History:   Diagnosis Date    BRCA gene mutation negative     Helicobacter pylori (H. pylori) infection     Hepatitis C antibody positive in blood 01/06/2021    RNA NEGATIVE    Hepatitis C virus infection cured after antiviral drug therapy 01/2022    Hypertension     LGSIL on Pap smear of cervix 03/03/2022    Plan- Colpo(3/22/22- LGSIL/BOY-I) // 8/31/22- Repeat pap Neg// 7/10/23- Pap//    Migraines      Past Surgical History:   Procedure Laterality Date    CERVICAL BIOPSY  W/ LOOP ELECTRODE EXCISION  04/21/2022    LGSIL/BOY-I per Dr. Cowan    COLPOSCOPY  03/22/2022    LGSIL/BOY-I    DILATION AND CURETTAGE OF UTERUS      Endocervical Curettage  04/21/2022    Benign Tissue    TONSILLECTOMY       Family History   Problem Relation Age of Onset    Hypertension Father     Hypertension Sister      Social History     Socioeconomic History    Marital status: Single   Tobacco Use    Smoking status: Never    Smokeless tobacco: Never   Substance and Sexual Activity    Alcohol use: No    Drug use: Never    Sexual activity: Yes     Partners: Male     Patient Active Problem List   Diagnosis    Essential hypertension    Obesity (BMI 30.0-34.9)    Chronic bilateral low back pain without sciatica    Decreased ROM of lumbar spine    Insomnia     Review of patient's allergies indicates:   Allergen Reactions    Bactrim  [sulfamethoxazole-trimethoprim] Swelling       The following were reviewed at this visit: active problem list, medication list, allergies, family history, social history, and health maintenance.    Medications:  Current Outpatient Medications on File Prior to Visit   Medication Sig Dispense Refill    amLODIPine (NORVASC) 5 MG tablet Take 1 tablet (5 mg total) by mouth once daily. 90 tablet 3    DULCOLAX, BISACODYL, ORAL Take by mouth.      hydroCHLOROthiazide (HYDRODIURIL) 25 MG tablet Take 1 tablet (25 mg total) by mouth once daily. 90 tablet 1    levonorgestrel-ethinyl estradiol (VIENVA) 0.1-20 mg-mcg per tablet TAKE 1 TABLET BY MOUTH EVERY DAY 28 tablet 7    traZODone (DESYREL) 50 MG tablet Take 1 tablet (50 mg total) by mouth every evening. 30 tablet 1     No current facility-administered medications on file prior to visit.       Medications have been reviewed and reconciled with patient at this visit.  Barriers to medications reviewed with patient.    Adverse reactions to current medications reviewed with patient..    Over the counter medications reviewed and reconciled with patient.    Exam:  Wt Readings from Last 3 Encounters:   11/03/23 86.7 kg (191 lb 2.2 oz)   10/16/23 87.4 kg (192 lb 10.9 oz)   09/19/23 85.6 kg (188 lb 11.4 oz)     Temp Readings from Last 3 Encounters:   11/03/23 98.2 °F (36.8 °C) (Tympanic)   10/16/23 97.1 °F (36.2 °C)   09/19/23 97 °F (36.1 °C) (Tympanic)     BP Readings from Last 3 Encounters:   11/03/23 120/88   10/16/23 124/88   09/19/23 (!) 140/100     Pulse Readings from Last 3 Encounters:   11/03/23 99   10/16/23 95   09/19/23 74     There is no height or weight on file to calculate BMI.      Review of Systems   Constitutional:  Negative for chills, fever and weight loss.   HENT:  Positive for congestion. Negative for ear pain and sore throat.    Respiratory:  Positive for cough. Negative for hemoptysis, shortness of breath and wheezing.    Gastrointestinal:  Negative for  heartburn.   Musculoskeletal:  Negative for myalgias.   Skin:  Negative for rash.   Neurological:  Negative for headaches.   Endo/Heme/Allergies:  Negative for environmental allergies.     Answers submitted by the patient for this visit:  Cough Questionnaire (Submitted on 12/26/2023)  Chief Complaint: Cough  Chronicity: new  Onset: in the past 7 days  Progression since onset: gradually worsening  Frequency: every few minutes  Cough characteristics: productive of brown sputum  nasal congestion: Yes  postnasal drip: Yes  rhinorrhea: Yes  sweats: No  Aggravated by: nothing  asthma: No  bronchiectasis: No  bronchitis: No  COPD: No  emphysema: No  pneumonia: No  Treatments tried: OTC cough suppressant  Improvement on treatment: mild      Physical Exam  Vitals and nursing note reviewed.   Constitutional:       Appearance: She is obese.   HENT:      Head: Normocephalic and atraumatic.      Salivary Glands: Right salivary gland is not diffusely enlarged or tender. Left salivary gland is not diffusely enlarged or tender.      Right Ear: Tympanic membrane normal.      Left Ear: Tympanic membrane normal.      Nose: Congestion and rhinorrhea present.      Right Sinus: No maxillary sinus tenderness or frontal sinus tenderness.      Left Sinus: No maxillary sinus tenderness or frontal sinus tenderness.      Mouth/Throat:      Mouth: Mucous membranes are moist.      Pharynx: Uvula midline.   Cardiovascular:      Rate and Rhythm: Normal rate and regular rhythm.      Pulses: Normal pulses.      Heart sounds: Normal heart sounds.   Pulmonary:      Effort: Pulmonary effort is normal. No respiratory distress.      Breath sounds: Normal breath sounds. No wheezing or rales.   Abdominal:      General: Bowel sounds are normal.   Skin:     General: Skin is dry.   Neurological:      Mental Status: She is alert and oriented to person, place, and time.   Psychiatric:         Mood and Affect: Mood normal.         Behavior: Behavior normal.          Thought Content: Thought content normal.         Judgment: Judgment normal.         Laboratory Reviewed ({Yes)  Lab Results   Component Value Date    WBC 7.28 10/16/2023    HGB 12.3 10/16/2023    HCT 39.4 10/16/2023     10/16/2023    CHOL 154 10/16/2023    TRIG 58 10/16/2023    HDL 39 (L) 10/16/2023    ALT 24 10/16/2023    AST 19 10/16/2023     10/16/2023    K 3.6 10/16/2023     10/16/2023    CREATININE 0.8 10/16/2023    BUN 10 10/16/2023    CO2 24 10/16/2023    TSH 1.891 03/19/2021    INR 1.1 11/21/2019    HGBA1C 5.2 10/16/2023           Ruma was seen today for cough and nasal congestion.    Diagnoses and all orders for this visit:    URI with cough and congestion  -     promethazine-dextromethorphan (PROMETHAZINE-DM) 6.25-15 mg/5 mL Syrp; Take 5 mLs by mouth every 6 (six) hours as needed (cough).  -     methylPREDNISolone (MEDROL DOSEPACK) 4 mg tablet; use as directed    Essential hypertension  At visit, Blood pressure is at goal. Continue current medications      Obesity (BMI 30.0-34.9)  Encouraged healthy diet and exercise as tolerated to help bring BMI into normal range.        Instructions  Drink plenty of fluids  Get plenty of Rest  Take OTC tylenol and or ibuprofen as directed on package for pain/fever. Do not take more than package suggests to take     Continue taking antihistamine and nasal spray     Care Plan/Goals: Reviewed    Goals    None         Follow up: No follow-ups on file.    After visit summary was printed and given to patient upon discharge today.  Patient goals and care plan are included in After Visit Summary.

## 2024-01-02 ENCOUNTER — PATIENT MESSAGE (OUTPATIENT)
Dept: INTERNAL MEDICINE | Facility: CLINIC | Age: 39
End: 2024-01-02
Payer: COMMERCIAL

## 2024-03-11 DIAGNOSIS — G47.00 INSOMNIA, UNSPECIFIED TYPE: ICD-10-CM

## 2024-03-11 RX ORDER — TRAZODONE HYDROCHLORIDE 50 MG/1
50 TABLET ORAL NIGHTLY
Qty: 30 TABLET | Refills: 1 | Status: SHIPPED | OUTPATIENT
Start: 2024-03-11

## 2024-03-11 NOTE — TELEPHONE ENCOUNTER
Refill Routing Note   Medication(s) are not appropriate for processing by [facility] for the following reason(s):        Non-participating provider    [facility] action(s):  Route               Appointments  past 12m or future 3m with PCP    Date Provider   Last Visit   12/26/2023 Gerda Cristina, NP   Next Visit   Visit date not found Gerda Cristina, NP   ED visits in past 90 days: 0        Note composed:3:58 PM 03/11/2024

## 2024-04-16 ENCOUNTER — LAB VISIT (OUTPATIENT)
Dept: LAB | Facility: HOSPITAL | Age: 39
End: 2024-04-16
Attending: INTERNAL MEDICINE
Payer: COMMERCIAL

## 2024-04-16 ENCOUNTER — OFFICE VISIT (OUTPATIENT)
Dept: INTERNAL MEDICINE | Facility: CLINIC | Age: 39
End: 2024-04-16
Payer: COMMERCIAL

## 2024-04-16 VITALS
WEIGHT: 193.56 LBS | HEIGHT: 65 IN | HEART RATE: 78 BPM | DIASTOLIC BLOOD PRESSURE: 88 MMHG | SYSTOLIC BLOOD PRESSURE: 138 MMHG | TEMPERATURE: 98 F | RESPIRATION RATE: 20 BRPM | OXYGEN SATURATION: 99 % | BODY MASS INDEX: 32.25 KG/M2

## 2024-04-16 DIAGNOSIS — E66.9 OBESITY (BMI 30.0-34.9): ICD-10-CM

## 2024-04-16 DIAGNOSIS — Z00.00 ANNUAL PHYSICAL EXAM: Primary | ICD-10-CM

## 2024-04-16 DIAGNOSIS — Z00.00 ANNUAL PHYSICAL EXAM: ICD-10-CM

## 2024-04-16 DIAGNOSIS — I10 ESSENTIAL HYPERTENSION: ICD-10-CM

## 2024-04-16 LAB
ALBUMIN SERPL BCP-MCNC: 3.6 G/DL (ref 3.5–5.2)
ALP SERPL-CCNC: 74 U/L (ref 55–135)
ALT SERPL W/O P-5'-P-CCNC: 20 U/L (ref 10–44)
ANION GAP SERPL CALC-SCNC: 8 MMOL/L (ref 8–16)
AST SERPL-CCNC: 14 U/L (ref 10–40)
BILIRUB SERPL-MCNC: 0.8 MG/DL (ref 0.1–1)
BUN SERPL-MCNC: 10 MG/DL (ref 6–20)
CALCIUM SERPL-MCNC: 9.3 MG/DL (ref 8.7–10.5)
CHLORIDE SERPL-SCNC: 107 MMOL/L (ref 95–110)
CHOLEST SERPL-MCNC: 173 MG/DL (ref 120–199)
CHOLEST/HDLC SERPL: 4.1 {RATIO} (ref 2–5)
CO2 SERPL-SCNC: 25 MMOL/L (ref 23–29)
CREAT SERPL-MCNC: 0.8 MG/DL (ref 0.5–1.4)
EST. GFR  (NO RACE VARIABLE): >60 ML/MIN/1.73 M^2
GLUCOSE SERPL-MCNC: 90 MG/DL (ref 70–110)
HDLC SERPL-MCNC: 42 MG/DL (ref 40–75)
HDLC SERPL: 24.3 % (ref 20–50)
LDLC SERPL CALC-MCNC: 120.8 MG/DL (ref 63–159)
NONHDLC SERPL-MCNC: 131 MG/DL
POTASSIUM SERPL-SCNC: 4.3 MMOL/L (ref 3.5–5.1)
PROT SERPL-MCNC: 7.7 G/DL (ref 6–8.4)
SODIUM SERPL-SCNC: 140 MMOL/L (ref 136–145)
TRIGL SERPL-MCNC: 51 MG/DL (ref 30–150)
TSH SERPL DL<=0.005 MIU/L-ACNC: 1.48 UIU/ML (ref 0.4–4)

## 2024-04-16 PROCEDURE — 3008F BODY MASS INDEX DOCD: CPT | Mod: CPTII,S$GLB,, | Performed by: INTERNAL MEDICINE

## 2024-04-16 PROCEDURE — 1160F RVW MEDS BY RX/DR IN RCRD: CPT | Mod: CPTII,S$GLB,, | Performed by: INTERNAL MEDICINE

## 2024-04-16 PROCEDURE — 36415 COLL VENOUS BLD VENIPUNCTURE: CPT | Performed by: INTERNAL MEDICINE

## 2024-04-16 PROCEDURE — 3079F DIAST BP 80-89 MM HG: CPT | Mod: CPTII,S$GLB,, | Performed by: INTERNAL MEDICINE

## 2024-04-16 PROCEDURE — 3075F SYST BP GE 130 - 139MM HG: CPT | Mod: CPTII,S$GLB,, | Performed by: INTERNAL MEDICINE

## 2024-04-16 PROCEDURE — 99395 PREV VISIT EST AGE 18-39: CPT | Mod: S$GLB,,, | Performed by: INTERNAL MEDICINE

## 2024-04-16 PROCEDURE — 99213 OFFICE O/P EST LOW 20 MIN: CPT | Mod: 25,S$GLB,, | Performed by: INTERNAL MEDICINE

## 2024-04-16 PROCEDURE — 84443 ASSAY THYROID STIM HORMONE: CPT | Performed by: INTERNAL MEDICINE

## 2024-04-16 PROCEDURE — 80053 COMPREHEN METABOLIC PANEL: CPT | Performed by: INTERNAL MEDICINE

## 2024-04-16 PROCEDURE — 80061 LIPID PANEL: CPT | Performed by: INTERNAL MEDICINE

## 2024-04-16 PROCEDURE — 1159F MED LIST DOCD IN RCRD: CPT | Mod: CPTII,S$GLB,, | Performed by: INTERNAL MEDICINE

## 2024-04-16 PROCEDURE — 99999 PR PBB SHADOW E&M-EST. PATIENT-LVL IV: CPT | Mod: PBBFAC,,, | Performed by: INTERNAL MEDICINE

## 2024-04-16 RX ORDER — AMLODIPINE BESYLATE 10 MG/1
10 TABLET ORAL DAILY
Qty: 90 TABLET | Refills: 0 | Status: SHIPPED | OUTPATIENT
Start: 2024-04-16 | End: 2024-05-24

## 2024-04-16 NOTE — PROGRESS NOTES
Ruma Keane  38 y.o. Black or  female  [id number]    Chief Complaint:  Chief Complaint   Patient presents with    Annual Exam       History of Present Illness:  Presents for an annual exam.   Has been getting varying b/p readings at home because she is not taking HCTZ daily. She drives a lot during the day and cannot take it and does not want to be up all night urinating. She is taking amlodipine as prescribed.     Laboratory   Lab Results   Component Value Date    WBC 7.28 10/16/2023    HGB 12.3 10/16/2023    HCT 39.4 10/16/2023     10/16/2023    CHOL 154 10/16/2023    TRIG 58 10/16/2023    HDL 39 (L) 10/16/2023    ALT 24 10/16/2023    AST 19 10/16/2023     10/16/2023    K 3.6 10/16/2023     10/16/2023    CREATININE 0.8 10/16/2023    BUN 10 10/16/2023    CO2 24 10/16/2023    TSH 1.891 03/19/2021    INR 1.1 11/21/2019    HGBA1C 5.2 10/16/2023     Review of Systems   Constitutional:  Negative for fever.   Eyes:  Negative for blurred vision.   Respiratory:  Negative for cough, shortness of breath and wheezing.    Cardiovascular:  Negative for chest pain and leg swelling.   Gastrointestinal:  Negative for abdominal pain.   Genitourinary:  Negative for dysuria.   Neurological:  Negative for dizziness and headaches.       The following were reviewed: Active problem list, medication list, allergies, family history, social history, and Health Maintenance.     History:  Past Medical History:   Diagnosis Date    BRCA gene mutation negative     Helicobacter pylori (H. pylori) infection     Hepatitis C antibody positive in blood 01/06/2021    RNA NEGATIVE    Hepatitis C virus infection cured after antiviral drug therapy 01/2022    Hypertension     LGSIL on Pap smear of cervix 03/03/2022    Plan- Colpo(3/22/22- LGSIL/BOY-I) // 8/31/22- Repeat pap Neg// 7/10/23- Pap//    Migraines      Past Surgical History:   Procedure Laterality Date    CERVICAL BIOPSY  W/ LOOP ELECTRODE EXCISION   04/21/2022    LGSIL/BOY-I per Dr. Cowan    COLPOSCOPY  03/22/2022    LGSIL/BOY-I    DILATION AND CURETTAGE OF UTERUS      Endocervical Curettage  04/21/2022    Benign Tissue    TONSILLECTOMY       Family History   Problem Relation Name Age of Onset    Hypertension Father      Hypertension Sister       Social History     Socioeconomic History    Marital status: Single   Tobacco Use    Smoking status: Never    Smokeless tobacco: Never   Substance and Sexual Activity    Alcohol use: No    Drug use: Never    Sexual activity: Yes     Partners: Male     Social Determinants of Health     Financial Resource Strain: Low Risk  (12/26/2023)    Overall Financial Resource Strain (CARDIA)     Difficulty of Paying Living Expenses: Not very hard   Food Insecurity: No Food Insecurity (12/26/2023)    Hunger Vital Sign     Worried About Running Out of Food in the Last Year: Never true     Ran Out of Food in the Last Year: Never true   Transportation Needs: No Transportation Needs (12/26/2023)    PRAPARE - Transportation     Lack of Transportation (Medical): No     Lack of Transportation (Non-Medical): No   Physical Activity: Unknown (12/26/2023)    Exercise Vital Sign     Days of Exercise per Week: 0 days   Stress: No Stress Concern Present (12/26/2023)    [employer] [place] [employer] - Occupational Stress Questionnaire     Feeling of Stress : Not at all   Social Connections: Unknown (12/26/2023)    Social Connection and Isolation Panel [NHANES]     Frequency of Communication with Friends and Family: More than three times a week     Frequency of Social Gatherings with Friends and Family: More than three times a week     Active Member of Clubs or Organizations: No     Attends Club or Organization Meetings: Never     Marital Status: Never    Housing Stability: Unknown (12/26/2023)    Housing Stability Vital Sign     Unable to Pay for Housing in the Last Year: No     Unstable Housing in the Last Year: No     Patient  Active Problem List   Diagnosis    Essential hypertension    Obesity (BMI 30.0-34.9)    Chronic bilateral low back pain without sciatica    Decreased ROM of lumbar spine    Insomnia     Review of patient's allergies indicates:   Allergen Reactions    Bactrim [sulfamethoxazole-trimethoprim] Swelling       Health Maintenance  Health Maintenance Topics with due status: Not Due       Topic Last Completion Date    TETANUS VACCINE 10/25/2017    Cervical Cancer Screening 07/10/2023    Hemoglobin A1c (Diabetic Prevention Screening) 10/16/2023     Health Maintenance Due   Topic Date Due    Pneumococcal Vaccines (Age 0-64) (1 of 2 - PCV) Never done    COVID-19 Vaccine (7 - 2023-24 season) 09/01/2023       Medications:  Current Outpatient Medications on File Prior to Visit   Medication Sig Dispense Refill    DULCOLAX, BISACODYL, ORAL Take by mouth.      hydroCHLOROthiazide (HYDRODIURIL) 25 MG tablet Take 1 tablet (25 mg total) by mouth once daily. 90 tablet 1    levonorgestrel-ethinyl estradiol (VIENVA) 0.1-20 mg-mcg per tablet TAKE 1 TABLET BY MOUTH EVERY DAY 28 tablet 7    traZODone (DESYREL) 50 MG tablet TAKE 1 TABLET(50 MG) BY MOUTH EVERY EVENING 30 tablet 1    amLODIPine (NORVASC) 5 MG tablet Take 1 tablet (5 mg total) by mouth once daily. 90 tablet 3       Medications have been reviewed and reconciled with patient at visit today.    Exam:  Vitals:    04/16/24 0850   BP: 138/88   Pulse: 78   Resp: 20   Temp: 98.1 °F (36.7 °C)     Weight: 87.8 kg (193 lb 9 oz)   Body mass index is 32.21 kg/m².      Physical Exam  Vitals reviewed.   Constitutional:       General: She is not in acute distress.     Appearance: She is well-developed. She is not ill-appearing.   Eyes:      General: No scleral icterus.  Cardiovascular:      Rate and Rhythm: Normal rate and regular rhythm.      Heart sounds: Normal heart sounds.   Pulmonary:      Effort: Pulmonary effort is normal. No respiratory distress.      Breath sounds: Normal breath  sounds. No wheezing or rales.   Abdominal:      General: Bowel sounds are normal.      Palpations: Abdomen is soft.   Musculoskeletal:      Right lower leg: No edema.      Left lower leg: No edema.   Skin:     General: Skin is warm and dry.   Neurological:      Mental Status: She is alert and oriented to person, place, and time.   Psychiatric:         Behavior: Behavior normal.       Assessment:  The primary encounter diagnosis was Annual physical exam. Diagnoses of Essential hypertension and Obesity (BMI 30.0-34.9) were also pertinent to this visit.    Plan:  Annual physical exam  -     Counseled regarding age appropriate screenings and immunizations  -     Counseled regarding lifestyle modifications  -     Comprehensive Metabolic Panel; Future; Expected date: 04/16/2024  -     Lipid panel; Future; Expected date: 04/16/2024  -     TSH; Future    Essential hypertension  -     change amLODIPine (NORVASC) to 10 MG tablet; Take 1 tablet (10 mg total) by mouth once daily.  Dispense: 90 tablet; Refill: 0    Obesity (BMI 30.0-34.9)  -     Lifestyle modifications discussed    Labs today.     Follow up in about 3 months (around 7/16/2024).

## 2024-05-10 ENCOUNTER — TELEPHONE (OUTPATIENT)
Dept: PSYCHIATRY | Facility: CLINIC | Age: 39
End: 2024-05-10
Payer: COMMERCIAL

## 2024-05-15 ENCOUNTER — TELEPHONE (OUTPATIENT)
Dept: INTERNAL MEDICINE | Facility: CLINIC | Age: 39
End: 2024-05-15
Payer: COMMERCIAL

## 2024-05-15 NOTE — TELEPHONE ENCOUNTER
----- Message from Chance Perez sent at 5/15/2024  8:14 AM CDT -----  Contact: hitesh Hendrix is calling regarding side effects of blood pressure medication.  Please give her a call back at [phone]

## 2024-05-15 NOTE — TELEPHONE ENCOUNTER
Increased Amlodipine to 10 mg she is now having swelling she is asking if she can go back on the Amlodipine 5 mg

## 2024-05-24 ENCOUNTER — OFFICE VISIT (OUTPATIENT)
Dept: INTERNAL MEDICINE | Facility: CLINIC | Age: 39
End: 2024-05-24
Payer: COMMERCIAL

## 2024-05-24 VITALS
WEIGHT: 194 LBS | OXYGEN SATURATION: 97 % | RESPIRATION RATE: 18 BRPM | HEIGHT: 65 IN | TEMPERATURE: 98 F | SYSTOLIC BLOOD PRESSURE: 142 MMHG | BODY MASS INDEX: 32.32 KG/M2 | DIASTOLIC BLOOD PRESSURE: 96 MMHG | HEART RATE: 85 BPM

## 2024-05-24 DIAGNOSIS — R35.0 URINARY FREQUENCY: ICD-10-CM

## 2024-05-24 DIAGNOSIS — I10 ESSENTIAL HYPERTENSION: ICD-10-CM

## 2024-05-24 DIAGNOSIS — R10.9 ABDOMINAL DISCOMFORT: ICD-10-CM

## 2024-05-24 DIAGNOSIS — I10 ESSENTIAL HYPERTENSION: Primary | ICD-10-CM

## 2024-05-24 LAB
BACTERIA #/AREA URNS AUTO: ABNORMAL /HPF
BILIRUB UR QL STRIP: NEGATIVE
CLARITY UR REFRACT.AUTO: ABNORMAL
COLOR UR AUTO: YELLOW
GLUCOSE UR QL STRIP: NEGATIVE
HGB UR QL STRIP: NEGATIVE
KETONES UR QL STRIP: NEGATIVE
LEUKOCYTE ESTERASE UR QL STRIP: ABNORMAL
MICROSCOPIC COMMENT: ABNORMAL
NITRITE UR QL STRIP: NEGATIVE
PH UR STRIP: 6 [PH] (ref 5–8)
PROT UR QL STRIP: ABNORMAL
SP GR UR STRIP: 1.03 (ref 1–1.03)
URN SPEC COLLECT METH UR: ABNORMAL
WBC #/AREA URNS AUTO: 13 /HPF (ref 0–5)

## 2024-05-24 PROCEDURE — 3008F BODY MASS INDEX DOCD: CPT | Mod: CPTII,S$GLB,, | Performed by: INTERNAL MEDICINE

## 2024-05-24 PROCEDURE — 1160F RVW MEDS BY RX/DR IN RCRD: CPT | Mod: CPTII,S$GLB,, | Performed by: INTERNAL MEDICINE

## 2024-05-24 PROCEDURE — 99214 OFFICE O/P EST MOD 30 MIN: CPT | Mod: S$GLB,,, | Performed by: INTERNAL MEDICINE

## 2024-05-24 PROCEDURE — 99999 PR PBB SHADOW E&M-EST. PATIENT-LVL IV: CPT | Mod: PBBFAC,,, | Performed by: INTERNAL MEDICINE

## 2024-05-24 PROCEDURE — 3080F DIAST BP >= 90 MM HG: CPT | Mod: CPTII,S$GLB,, | Performed by: INTERNAL MEDICINE

## 2024-05-24 PROCEDURE — 3077F SYST BP >= 140 MM HG: CPT | Mod: CPTII,S$GLB,, | Performed by: INTERNAL MEDICINE

## 2024-05-24 PROCEDURE — 81001 URINALYSIS AUTO W/SCOPE: CPT | Performed by: INTERNAL MEDICINE

## 2024-05-24 PROCEDURE — 1159F MED LIST DOCD IN RCRD: CPT | Mod: CPTII,S$GLB,, | Performed by: INTERNAL MEDICINE

## 2024-05-24 RX ORDER — AMLODIPINE BESYLATE 2.5 MG/1
2.5 TABLET ORAL DAILY
Qty: 30 TABLET | Refills: 1 | Status: SHIPPED | OUTPATIENT
Start: 2024-05-24

## 2024-05-24 RX ORDER — AMLODIPINE BESYLATE 2.5 MG/1
TABLET ORAL
Qty: 90 TABLET | OUTPATIENT
Start: 2024-05-24

## 2024-05-24 NOTE — TELEPHONE ENCOUNTER
Refill Decision Note   Ruma Keane  is requesting a refill authorization.  Brief Assessment and Rationale for Refill:  Quick Discontinue     Medication Therapy Plan:  E-Prescribing Status: Receipt confirmed by Eddie #[id number] (5/24/24)      Comments:     Note composed:3:42 PM 05/24/2024

## 2024-05-24 NOTE — TELEPHONE ENCOUNTER
No care due was identified.  [facility] Embedded Care Due Messages. Reference number: [id number].   5/24/2024 12:20:27 PM CDT

## 2024-05-24 NOTE — PROGRESS NOTES
Ruma Keane  38 y.o. Black or  female  [id number]    Chief Complaint:  Chief Complaint   Patient presents with    Foot Swelling    Hypertension       History of Present Illness:  Reports swelling in her feet after increasing amlodipine to 10 mg daily. She went back down to 5 mg and continued to have swelling. Sh is taking HCTZ daily but did not take any medication today.   She is also concerned because she urinates frequently.   She has discomfort in her abdomen similar to when she had H. Pylori in the past. She would like to be retested.     History:  Past Medical History:   Diagnosis Date    BRCA gene mutation negative     Helicobacter pylori (H. pylori) infection     Hepatitis C antibody positive in blood 01/06/2021    RNA NEGATIVE    Hepatitis C virus infection cured after antiviral drug therapy 01/2022    Hypertension     LGSIL on Pap smear of cervix 03/03/2022    Plan- Colpo(3/22/22- LGSIL/BOY-I) // 8/31/22- Repeat pap Neg// 7/10/23- Pap//    Migraines        Medications:  Current Outpatient Medications on File Prior to Visit   Medication Sig Dispense Refill    DULCOLAX, BISACODYL, ORAL Take by mouth.      hydroCHLOROthiazide (HYDRODIURIL) 25 MG tablet Take 1 tablet (25 mg total) by mouth once daily. 90 tablet 1    levonorgestrel-ethinyl estradiol (VIENVA) 0.1-20 mg-mcg per tablet TAKE 1 TABLET BY MOUTH EVERY DAY 28 tablet 2    traZODone (DESYREL) 50 MG tablet TAKE 1 TABLET(50 MG) BY MOUTH EVERY EVENING 30 tablet 1    amLODIPine (NORVASC) 10 MG tablet Take 1 tablet (10 mg total) by mouth once daily. 90 tablet 0     No current facility-administered medications on file prior to visit.       Allergies:  Review of patient's allergies indicates:   Allergen Reactions    Bactrim [sulfamethoxazole-trimethoprim] Swelling       Review of Systems   Constitutional:  Positive for malaise/fatigue.   Eyes:  Negative for blurred vision.   Respiratory:  Negative for shortness of breath.    Cardiovascular:   Negative for chest pain, palpitations, orthopnea and PND.   Gastrointestinal:  Positive for abdominal pain.   Genitourinary:  Positive for frequency.   Musculoskeletal:  Negative for neck pain.   Neurological:  Positive for headaches.     Answers submitted by the patient for this visit:  High Blood Pressure Questionnaire (Submitted on 5/24/2024)  Chief Complaint: Hypertension  Chronicity: chronic  Onset: more than 1 year ago  Progression since onset: gradually improving  Condition status: controlled  anxiety: No  peripheral edema: Yes  sweats: No  Agents associated with hypertension: oral contraceptives  CAD risks: no known risk factors  Compliance problems: exercise  Past treatments: nothing  Improvement on treatment: no improvement    Exam:  Vitals:    05/24/24 1125   BP: (!) 142/96   Pulse: 85   Resp: 18   Temp: 98.1 °F (36.7 °C)     Weight: 88 kg (194 lb 0.1 oz)   Body mass index is 32.28 kg/m².      Physical Exam  Vitals reviewed.   Constitutional:       General: She is not in acute distress.     Appearance: She is well-developed. She is not ill-appearing.   Eyes:      General: No scleral icterus.  Cardiovascular:      Rate and Rhythm: Normal rate and regular rhythm.      Heart sounds: Normal heart sounds.   Pulmonary:      Effort: Pulmonary effort is normal. No respiratory distress.      Breath sounds: Normal breath sounds.   Abdominal:      General: Bowel sounds are normal.      Palpations: Abdomen is soft.   Skin:     General: Skin is warm and dry.   Neurological:      Mental Status: She is alert and oriented to person, place, and time.   Psychiatric:         Behavior: Behavior normal.         Assessment:  The primary encounter diagnosis was Essential hypertension. Diagnoses of Urinary frequency and Abdominal discomfort were also pertinent to this visit.    Plan:  Essential hypertension  -     decrease amLODIPine (NORVASC) 2.5 MG tablet; Take 1 tablet (2.5 mg total) by mouth once daily.  Dispense: 30  tablet; Refill: 1  -     continue HCTZ  -     Lifestyle modifications discussed  -     Urinalysis    Urinary frequency  -     Urinalysis    Abdominal discomfort  -     Urinalysis  -     H. pylori antigen, stool; Future; Expected date: 05/24/2024    Follow up in about 4 weeks (around 6/21/2024).

## 2024-05-27 ENCOUNTER — TELEPHONE (OUTPATIENT)
Dept: INTERNAL MEDICINE | Facility: CLINIC | Age: 39
End: 2024-05-27
Payer: COMMERCIAL

## 2024-05-27 DIAGNOSIS — R82.90 ABNORMAL URINALYSIS: Primary | ICD-10-CM

## 2024-05-27 RX ORDER — CIPROFLOXACIN 500 MG/1
500 TABLET ORAL 2 TIMES DAILY
Qty: 14 TABLET | Refills: 0 | Status: SHIPPED | OUTPATIENT
Start: 2024-05-27 | End: 2024-06-03

## 2024-05-28 ENCOUNTER — LAB VISIT (OUTPATIENT)
Dept: LAB | Facility: HOSPITAL | Age: 39
End: 2024-05-28
Attending: INTERNAL MEDICINE
Payer: COMMERCIAL

## 2024-05-28 DIAGNOSIS — R82.90 ABNORMAL URINALYSIS: ICD-10-CM

## 2024-05-28 PROCEDURE — 87086 URINE CULTURE/COLONY COUNT: CPT | Performed by: INTERNAL MEDICINE

## 2024-05-29 LAB
BACTERIA UR CULT: NORMAL
BACTERIA UR CULT: NORMAL

## 2024-06-18 ENCOUNTER — LAB VISIT (OUTPATIENT)
Dept: LAB | Facility: HOSPITAL | Age: 39
End: 2024-06-18
Attending: INTERNAL MEDICINE
Payer: COMMERCIAL

## 2024-06-18 DIAGNOSIS — R10.9 ABDOMINAL DISCOMFORT: ICD-10-CM

## 2024-06-18 PROCEDURE — 87338 HPYLORI STOOL AG IA: CPT | Performed by: INTERNAL MEDICINE

## 2024-06-24 ENCOUNTER — CLINICAL SUPPORT (OUTPATIENT)
Dept: INTERNAL MEDICINE | Facility: CLINIC | Age: 39
End: 2024-06-24
Payer: COMMERCIAL

## 2024-06-24 ENCOUNTER — PATIENT MESSAGE (OUTPATIENT)
Dept: INTERNAL MEDICINE | Facility: CLINIC | Age: 39
End: 2024-06-24

## 2024-06-24 VITALS — DIASTOLIC BLOOD PRESSURE: 96 MMHG | SYSTOLIC BLOOD PRESSURE: 134 MMHG

## 2024-06-24 DIAGNOSIS — I10 ESSENTIAL HYPERTENSION: Primary | ICD-10-CM

## 2024-06-24 PROCEDURE — 99999 PR PBB SHADOW E&M-EST. PATIENT-LVL I: CPT | Mod: PBBFAC,,,

## 2024-06-24 NOTE — PROGRESS NOTES
B/P remains slightly elevated.   Continue amlodipine and HCTZ.   Recommend following a low sodium diet.   F/U with Odalis for HTN.

## 2024-06-24 NOTE — PROGRESS NOTES
Patient came in today for a BP check. She stated that she has been taking both the Amlodipine and the HCTZ. 1st reading was 136/96 and after 10 minutes, 2nd reading was 134/96. Patient was informed that a message would be sent to the physician for review and staff would call her with any recommendations.

## 2024-06-26 LAB — H PYLORI AG STL QL IA: NOT DETECTED

## 2024-07-01 ENCOUNTER — OFFICE VISIT (OUTPATIENT)
Dept: INTERNAL MEDICINE | Facility: CLINIC | Age: 39
End: 2024-07-01
Payer: COMMERCIAL

## 2024-07-01 VITALS
HEART RATE: 97 BPM | WEIGHT: 195.13 LBS | TEMPERATURE: 98 F | OXYGEN SATURATION: 98 % | DIASTOLIC BLOOD PRESSURE: 88 MMHG | SYSTOLIC BLOOD PRESSURE: 138 MMHG | RESPIRATION RATE: 21 BRPM | BODY MASS INDEX: 32.47 KG/M2

## 2024-07-01 DIAGNOSIS — I10 ESSENTIAL HYPERTENSION: Primary | ICD-10-CM

## 2024-07-01 PROCEDURE — 1159F MED LIST DOCD IN RCRD: CPT | Mod: CPTII,S$GLB,, | Performed by: PHYSICIAN ASSISTANT

## 2024-07-01 PROCEDURE — 3008F BODY MASS INDEX DOCD: CPT | Mod: CPTII,S$GLB,, | Performed by: PHYSICIAN ASSISTANT

## 2024-07-01 PROCEDURE — 3075F SYST BP GE 130 - 139MM HG: CPT | Mod: CPTII,S$GLB,, | Performed by: PHYSICIAN ASSISTANT

## 2024-07-01 PROCEDURE — 3079F DIAST BP 80-89 MM HG: CPT | Mod: CPTII,S$GLB,, | Performed by: PHYSICIAN ASSISTANT

## 2024-07-01 PROCEDURE — 99999 PR PBB SHADOW E&M-EST. PATIENT-LVL III: CPT | Mod: PBBFAC,,, | Performed by: PHYSICIAN ASSISTANT

## 2024-07-01 PROCEDURE — 1160F RVW MEDS BY RX/DR IN RCRD: CPT | Mod: CPTII,S$GLB,, | Performed by: PHYSICIAN ASSISTANT

## 2024-07-01 PROCEDURE — G2211 COMPLEX E/M VISIT ADD ON: HCPCS | Mod: S$GLB,,, | Performed by: PHYSICIAN ASSISTANT

## 2024-07-01 PROCEDURE — 99214 OFFICE O/P EST MOD 30 MIN: CPT | Mod: S$GLB,,, | Performed by: PHYSICIAN ASSISTANT

## 2024-07-01 RX ORDER — AMLODIPINE BESYLATE 2.5 MG/1
2.5 TABLET ORAL DAILY
Qty: 90 TABLET | Refills: 1 | Status: SHIPPED | OUTPATIENT
Start: 2024-07-01

## 2024-07-01 RX ORDER — HYDROCHLOROTHIAZIDE 25 MG/1
25 TABLET ORAL DAILY
Qty: 90 TABLET | Refills: 1 | Status: SHIPPED | OUTPATIENT
Start: 2024-07-01

## 2024-07-01 NOTE — PROGRESS NOTES
Subjective:      Patient ID: Ruma Keane is a 38 y.o. female.    Chief Complaint: Hypertension (She is here for HTN. States headaches and blurry vision when BP is elevated. )    Patient is known to me, being seen today for BP f/u.     HTN- amlodipine 2.5mg, HCTZ 25mg   Higher doses of amlodipine caused swelling   Does not check BP at home     Last visit May 2024 with PCP, BP elevated at that time, meds adjusted.       Review of Systems   Constitutional:  Negative for chills, diaphoresis and fever.   HENT:  Negative for congestion, rhinorrhea and sore throat.    Eyes:  Positive for visual disturbance (due for eye exam).   Respiratory:  Negative for cough, shortness of breath and wheezing.    Gastrointestinal:  Negative for abdominal pain, constipation, diarrhea, nausea and vomiting.   Skin:  Negative for rash.   Neurological:  Positive for headaches (cycle is coming on, feels this is the reason). Negative for dizziness and light-headedness.       Objective:   /88 (BP Location: Right arm, Patient Position: Sitting, BP Method: Large (Manual))   Pulse 97   Temp 97.7 °F (36.5 °C) (Tympanic)   Resp (!) 21   Wt 88.5 kg (195 lb 1.7 oz)   LMP 06/14/2024   SpO2 98%   BMI 32.47 kg/m²   Physical Exam  Constitutional:       General: She is not in acute distress.     Appearance: Normal appearance. She is well-developed. She is not ill-appearing.   HENT:      Head: Normocephalic and atraumatic.   Cardiovascular:      Rate and Rhythm: Normal rate and regular rhythm.      Heart sounds: Normal heart sounds. No murmur heard.  Pulmonary:      Effort: Pulmonary effort is normal. No respiratory distress.      Breath sounds: Normal breath sounds. No decreased breath sounds.   Musculoskeletal:      Right lower leg: No edema.      Left lower leg: No edema.   Skin:     General: Skin is warm and dry.      Findings: No rash.   Psychiatric:         Speech: Speech normal.         Behavior: Behavior normal.         Thought  Content: Thought content normal.       Assessment:      1. Essential hypertension       Plan:   Essential hypertension  -     amLODIPine (NORVASC) 2.5 MG tablet; Take 1 tablet (2.5 mg total) by mouth once daily.  Dispense: 90 tablet; Refill: 1  -     hydroCHLOROthiazide (HYDRODIURIL) 25 MG tablet; Take 1 tablet (25 mg total) by mouth once daily.  Dispense: 90 tablet; Refill: 1      Schedule eye exam     Recommend monitor BP at home routinely     F/u annually or sooner if needed

## 2024-07-28 DIAGNOSIS — I10 ESSENTIAL HYPERTENSION: ICD-10-CM

## 2024-07-28 NOTE — TELEPHONE ENCOUNTER
No care due was identified.  [facility] Embedded Care Due Messages. Reference number: [id number].   7/28/2024 3:45:43 PM CDT

## 2024-07-29 RX ORDER — AMLODIPINE BESYLATE 10 MG/1
10 TABLET ORAL
Qty: 90 TABLET | Refills: 0 | OUTPATIENT
Start: 2024-07-29

## 2024-07-29 NOTE — TELEPHONE ENCOUNTER
Refill Decision Note   Ruma Keane  is requesting a refill authorization.  Brief Assessment and Rationale for Refill:  Quick Discontinue     Medication Therapy Plan:  discontinued on 5/24/2024 by Sherrie Cristina DO      Comments:     Note composed:5:47 AM 07/29/2024             Appointments     Last Visit   5/24/2024 Sherrie Cristina DO   Next Visit   Visit date not found Sherrie Cristina DO

## 2024-10-01 ENCOUNTER — OFFICE VISIT (OUTPATIENT)
Dept: INTERNAL MEDICINE | Facility: CLINIC | Age: 39
End: 2024-10-01
Payer: COMMERCIAL

## 2024-10-01 VITALS
OXYGEN SATURATION: 96 % | SYSTOLIC BLOOD PRESSURE: 138 MMHG | TEMPERATURE: 98 F | HEART RATE: 76 BPM | BODY MASS INDEX: 32.87 KG/M2 | DIASTOLIC BLOOD PRESSURE: 88 MMHG | HEIGHT: 65 IN | WEIGHT: 197.31 LBS

## 2024-10-01 DIAGNOSIS — E66.811 OBESITY (BMI 30.0-34.9): ICD-10-CM

## 2024-10-01 DIAGNOSIS — I10 ESSENTIAL HYPERTENSION: ICD-10-CM

## 2024-10-01 DIAGNOSIS — J06.9 URI WITH COUGH AND CONGESTION: Primary | ICD-10-CM

## 2024-10-01 DIAGNOSIS — J06.9 URI WITH COUGH AND CONGESTION: ICD-10-CM

## 2024-10-01 PROCEDURE — G2211 COMPLEX E/M VISIT ADD ON: HCPCS | Mod: S$GLB,,,

## 2024-10-01 PROCEDURE — 3079F DIAST BP 80-89 MM HG: CPT | Mod: CPTII,S$GLB,,

## 2024-10-01 PROCEDURE — 99214 OFFICE O/P EST MOD 30 MIN: CPT | Mod: S$GLB,,,

## 2024-10-01 PROCEDURE — 3075F SYST BP GE 130 - 139MM HG: CPT | Mod: CPTII,S$GLB,,

## 2024-10-01 PROCEDURE — 1160F RVW MEDS BY RX/DR IN RCRD: CPT | Mod: CPTII,S$GLB,,

## 2024-10-01 PROCEDURE — 99999 PR PBB SHADOW E&M-EST. PATIENT-LVL III: CPT | Mod: PBBFAC,,,

## 2024-10-01 PROCEDURE — 3008F BODY MASS INDEX DOCD: CPT | Mod: CPTII,S$GLB,,

## 2024-10-01 PROCEDURE — 1159F MED LIST DOCD IN RCRD: CPT | Mod: CPTII,S$GLB,,

## 2024-10-01 RX ORDER — MONTELUKAST SODIUM 10 MG/1
10 TABLET ORAL NIGHTLY
Qty: 30 TABLET | Refills: 0 | Status: SHIPPED | OUTPATIENT
Start: 2024-10-01 | End: 2024-10-31

## 2024-10-01 RX ORDER — PROMETHAZINE HYDROCHLORIDE AND DEXTROMETHORPHAN HYDROBROMIDE 6.25; 15 MG/5ML; MG/5ML
5 SYRUP ORAL EVERY 4 HOURS PRN
Qty: 180 ML | Refills: 0 | Status: SHIPPED | OUTPATIENT
Start: 2024-10-01 | End: 2024-10-11

## 2024-10-01 RX ORDER — MONTELUKAST SODIUM 10 MG/1
10 TABLET ORAL NIGHTLY
Qty: 90 TABLET | OUTPATIENT
Start: 2024-10-01

## 2024-10-01 RX ORDER — FLUTICASONE PROPIONATE 50 MCG
SPRAY, SUSPENSION (ML) NASAL
Qty: 48 G | OUTPATIENT
Start: 2024-10-01

## 2024-10-01 RX ORDER — FLUTICASONE PROPIONATE 50 MCG
1 SPRAY, SUSPENSION (ML) NASAL DAILY
Qty: 18.2 ML | Refills: 0 | Status: SHIPPED | OUTPATIENT
Start: 2024-10-01

## 2024-10-01 NOTE — PROGRESS NOTES
Ruma Keane  10/01/2024  [id number]    Sherrie Cristina DO  Patient Care Team:  Sherrie Cristina DO as PCP - General (Internal Medicine)  Corby Cowan MD as Obstetrician (Obstetrics and Gynecology)          Visit Type:an urgent visit for a new problem    Chief Complaint:  Chief Complaint   Patient presents with    Cough    Sinusitis    Nasal Congestion    Hoarse          History of Present Illness:    History of Present Illness    CHIEF COMPLAINT:  Ms. Keane presents today for cough and congestion.    UPPER RESPIRATORY INFECTION SYMPTOMS:  She reports experiencing a worsening productive cough at night with mucus, congestion, and watery eyes since Friday. She also complains of a sinus headache with associated pressure. She denies shortness of breath, wheezing, fever, body aches, or chills.    COVID-19 TESTING:  She has taken two at-home COVID-19 tests, both of which were negative.    CURRENT MEDICATIONS AND HOME REMEDIES:  She has been taking Allegra (OTC antihistamine) and promethazine cough syrup, which she reports has been helpful in reducing her cough. She also uses cough drops for symptom relief. She mentions having Flonase nasal spray but is unsure if she still has it.  For home remedies, she has been using yolanda root tea to help with her voice and cough.    FAMILY HISTORY:  She reports her client has been sick recently, which she believes may have been the source of her own illness.      ROS:  General: -fever, -chills, -fatigue, -weight gain, -weight loss  Eyes: -vision changes, -redness, -discharge  ENT: -ear pain, +nasal congestion, -sore throat  Cardiovascular: -chest pain, -palpitations, -lower extremity edema  Respiratory: +cough, -shortness of breath, -wheezing  Gastrointestinal: -abdominal pain, -nausea, -vomiting, -diarrhea, -constipation, -blood in stool  Genitourinary: -dysuria, -hematuria, -frequency  Musculoskeletal: -joint pain, -muscle pain  Skin: -rash, -lesion  Neurological:  +headache, -dizziness, -numbness, -tingling  Psychiatric: -anxiety, -depression, -sleep difficulty            History:  Past Medical History:   Diagnosis Date    BRCA gene mutation negative     Helicobacter pylori (H. pylori) infection     Hepatitis C antibody positive in blood 01/06/2021    RNA NEGATIVE    Hepatitis C virus infection cured after antiviral drug therapy 01/2022 9/19/2024-HCV RNA Not detected    Hypertension     LGSIL on Pap smear of cervix 03/03/2022    Plan- Colpo(3/22/22- LGSIL/BOY-I) // 8/31/22- Repeat pap Neg// 7/10/23- Pap//    Migraines      Past Surgical History:   Procedure Laterality Date    CERVICAL BIOPSY  W/ LOOP ELECTRODE EXCISION  04/21/2022    LGSIL/BOY-I per Dr. Cowan    COLPOSCOPY  03/22/2022    LGSIL/BOY-I    DILATION AND CURETTAGE OF UTERUS      Endocervical Curettage  04/21/2022    Benign Tissue    TONSILLECTOMY       Family History   Problem Relation Name Age of Onset    Hypertension Father      Hypertension Sister       Social History     Socioeconomic History    Marital status: Single   Tobacco Use    Smoking status: Never    Smokeless tobacco: Never   Substance and Sexual Activity    Alcohol use: No    Drug use: Never    Sexual activity: Yes     Partners: Male     Social Drivers of Health     Financial Resource Strain: Low Risk  (12/26/2023)    Overall Financial Resource Strain (CARDIA)     Difficulty of Paying Living Expenses: Not very hard   Food Insecurity: No Food Insecurity (12/26/2023)    Hunger Vital Sign     Worried About Running Out of Food in the Last Year: Never true     Ran Out of Food in the Last Year: Never true   Transportation Needs: No Transportation Needs (12/26/2023)    PRAPARE - Transportation     Lack of Transportation (Medical): No     Lack of Transportation (Non-Medical): No   Physical Activity: Unknown (12/26/2023)    Exercise Vital Sign     Days of Exercise per Week: 0 days   Stress: No Stress Concern Present (12/26/2023)    [employer] - Occupational Stress Questionnaire     Feeling of Stress : Not at all   Housing Stability: Unknown (12/26/2023)    Housing Stability Vital Sign     Unable to Pay for Housing in the Last Year: No     Unstable Housing in the Last Year: No     Patient Active Problem List   Diagnosis    Essential hypertension    Obesity (BMI 30.0-34.9)    Chronic bilateral low back pain without sciatica    Decreased ROM of lumbar spine    Insomnia     Review of patient's allergies indicates:   Allergen Reactions    Bactrim [sulfamethoxazole-trimethoprim] Swelling       The following were reviewed at this visit: active problem list, medication list, allergies, family history, social history, and health maintenance.    Medications:  Current Outpatient Medications on File Prior to Visit   Medication Sig Dispense Refill    amLODIPine (NORVASC) 2.5 MG tablet Take 1 tablet (2.5 mg total) by mouth once daily. 90 tablet 1    hydroCHLOROthiazide (HYDRODIURIL) 25 MG tablet Take 1 tablet (25 mg total) by mouth once daily. 90 tablet 1    levonorgestrel-ethinyl estradiol (VIENVA) 0.1-20 mg-mcg per tablet Take 1 tablet by mouth once daily. 28 tablet 11    traZODone (DESYREL) 50 MG tablet TAKE 1 TABLET(50 MG) BY MOUTH EVERY EVENING 30 tablet 1    DULCOLAX, BISACODYL, ORAL Take by mouth. (Patient not taking: Reported on 10/1/2024)       No current facility-administered medications on file prior to visit.       Medications have been reviewed and reconciled with patient at this visit.  Barriers to medications reviewed with patient.    Adverse reactions to current medications reviewed with patient..    Over the counter medications reviewed and reconciled with patient.    Exam:  Wt Readings from Last 3 Encounters:   10/01/24 89.5 kg (197 lb 5 oz)   09/19/24 89.8 kg (198 lb)   07/01/24 88.5 kg (195 lb 1.7 oz)     Temp Readings from Last 3 Encounters:   10/01/24 97.9 °F (36.6 °C) (Tympanic)   07/01/24 97.7 °F (36.5 °C) (Tympanic)    05/24/24 98.1 °F (36.7 °C) (Tympanic)     BP Readings from Last 3 Encounters:   10/01/24 138/88   09/19/24 (!) 150/100   07/01/24 138/88     Pulse Readings from Last 3 Encounters:   10/01/24 76   07/01/24 97   05/24/24 85     Body mass index is 32.83 kg/m².    Physical Exam  Nursing note reviewed.   Constitutional:       Appearance: She is obese.   HENT:      Head: Normocephalic and atraumatic.      Salivary Glands: Right salivary gland is not diffusely enlarged or tender. Left salivary gland is not diffusely enlarged or tender.      Right Ear: Tympanic membrane normal.      Left Ear: Tympanic membrane normal.      Nose: Congestion and rhinorrhea present.      Right Sinus: No maxillary sinus tenderness or frontal sinus tenderness.      Left Sinus: No maxillary sinus tenderness or frontal sinus tenderness.      Mouth/Throat:      Mouth: Mucous membranes are moist.      Pharynx: Uvula midline. No pharyngeal swelling.   Cardiovascular:      Rate and Rhythm: Normal rate and regular rhythm.      Heart sounds: Normal heart sounds.   Pulmonary:      Effort: Pulmonary effort is normal. No respiratory distress.      Breath sounds: Normal breath sounds.   Neurological:      Mental Status: She is alert and oriented to person, place, and time.   Psychiatric:         Mood and Affect: Mood normal.         Behavior: Behavior normal.         Thought Content: Thought content normal.         Judgment: Judgment normal.           Laboratory Reviewed ({Yes)  Lab Results   Component Value Date    WBC 7.28 10/16/2023    HGB 12.3 10/16/2023    HCT 39.4 10/16/2023     10/16/2023    CHOL 173 04/16/2024    TRIG 51 04/16/2024    HDL 42 04/16/2024    ALT 20 04/16/2024    AST 14 04/16/2024     04/16/2024    K 4.3 04/16/2024     04/16/2024    CREATININE 0.8 04/16/2024    BUN 10 04/16/2024    CO2 25 04/16/2024    TSH 1.478 04/16/2024    INR 1.1 11/21/2019    HGBA1C 5.2 10/16/2023       Ruma was seen today for cough,  sinusitis, nasal congestion and hoarse.    Diagnoses and all orders for this visit:    URI with cough and congestion  -     montelukast (SINGULAIR) 10 mg tablet; Take 1 tablet (10 mg total) by mouth every evening.  -     promethazine-dextromethorphan (PROMETHAZINE-DM) 6.25-15 mg/5 mL Syrp; Take 5 mLs by mouth every 4 (four) hours as needed (cough).  -     fluticasone propionate (FLONASE) 50 mcg/actuation nasal spray; 1 spray (50 mcg total) by Each Nostril route once daily.    Obesity (BMI 30.0-34.9)  Encouraged healthy diet and exercise as tolerated to help bring BMI into normal range.      Essential hypertension    At visit, Blood pressure is at goal. Continue current medications        Assessment & Plan    UPPER RESPIRATORY INFECTION:  - Assessed patient presenting with cough, congestion, and watery eyes, likely due to upper respiratory infection.  - Considered differential diagnosis, including COVID-19, which was ruled out by negative at-home tests.  - Evaluated effectiveness of current symptomatic treatments, including antihistamines and cough suppressants.  - Determined need for continued antihistamine therapy and additional nasal corticosteroid to manage symptoms.  - Started Flonase nasal spray to address nasal congestion and sinus pressure.  - Continued Allegra for symptom management.  - Refilled promethazine cough syrup for symptomatic relief of cough.    MEDICATIONS/SUPPLEMENTS:  - Considered cost-effectiveness of medication options, weighing Singulair against Allegra.        Visit today included increased complexity associated with the care of the episodic problem URI with cough and congestion , which was addressed while instituting co-management of the longitudinal care of the patient due to the serious and/or complex managed problem(s) .    I have evaluated and discussed management associated with medical care services that serve as the continuing focal point for all needed health care services and/or  with medical care services that are part of ongoing care related to my patient's single, serious condition or a complex condition(s).    I am providing ongoing care and I am the primary care provider for this patient, and they are being managed, monitored, and/or observed for their chronic conditions over time.     I have addressed their ongoing health maintenance requirements and needs for all health care services and reviewed co-management plans provided by specialty providers when available.    Health Maintenance Due   Topic Date Due    Pneumococcal Vaccines (Age 0-64) (1 of 2 - PCV) Never done    COVID-19 Vaccine (7 - 2024-25 season) 09/01/2024        Care Plan/Goals: Reviewed    Goals    None         Follow up: No follow-ups on file.    After visit summary was printed and given to patient upon discharge today.  Patient goals and care plan are included in After Visit Summary.

## 2024-10-01 NOTE — TELEPHONE ENCOUNTER
Refill Decision Note   Ruma Keane  is requesting a refill authorization.  Brief Assessment and Rationale for Refill:  Quick Discontinue     Medication Therapy Plan: E-Prescribing Status: Receipt confirmed by pharmacy (10/1/2024  8:47 AM CDT)      Comments:     Note composed:6:09 PM 10/01/2024

## 2024-10-29 DIAGNOSIS — J06.9 URI WITH COUGH AND CONGESTION: ICD-10-CM

## 2024-10-30 RX ORDER — MONTELUKAST SODIUM 10 MG/1
10 TABLET ORAL NIGHTLY
Qty: 90 TABLET | Refills: 3 | Status: SHIPPED | OUTPATIENT
Start: 2024-10-30

## 2024-10-30 RX ORDER — FLUTICASONE PROPIONATE 50 MCG
SPRAY, SUSPENSION (ML) NASAL
Qty: 16 G | Refills: 1 | Status: SHIPPED | OUTPATIENT
Start: 2024-10-30

## 2024-12-12 ENCOUNTER — PATIENT MESSAGE (OUTPATIENT)
Dept: RESEARCH | Facility: HOSPITAL | Age: 39
End: 2024-12-12
Payer: COMMERCIAL

## 2025-01-08 DIAGNOSIS — I10 ESSENTIAL HYPERTENSION: ICD-10-CM

## 2025-01-08 NOTE — TELEPHONE ENCOUNTER
No care due was identified.  [facility] Embedded Care Due Messages. Reference number: [id number].   1/08/2025 10:21:37 AM CST

## 2025-01-08 NOTE — TELEPHONE ENCOUNTER
Refill Routing Note   Medication(s) are not appropriate for processing by [facility] for the following reason(s):        No active prescription written by provider    [facility] action(s):  Defer             Appointments  past 12m or future 3m with PCP    Date Provider   Last Visit   5/24/2024 Sherrie Cristina, DO   Next Visit   4/16/2025 Sherrie Cristina DO   ED visits in past 90 days: 0        Note composed:2:15 PM 01/08/2025                No lesions; no rash detailed exam

## 2025-01-10 RX ORDER — HYDROCHLOROTHIAZIDE 25 MG/1
25 TABLET ORAL DAILY
Qty: 90 TABLET | Refills: 1 | Status: SHIPPED | OUTPATIENT
Start: 2025-01-10

## 2025-01-24 ENCOUNTER — OFFICE VISIT (OUTPATIENT)
Dept: INTERNAL MEDICINE | Facility: CLINIC | Age: 40
End: 2025-01-24
Payer: COMMERCIAL

## 2025-01-24 VITALS
TEMPERATURE: 98 F | DIASTOLIC BLOOD PRESSURE: 88 MMHG | RESPIRATION RATE: 21 BRPM | HEIGHT: 65 IN | WEIGHT: 202.63 LBS | OXYGEN SATURATION: 98 % | HEART RATE: 85 BPM | SYSTOLIC BLOOD PRESSURE: 130 MMHG | BODY MASS INDEX: 33.76 KG/M2

## 2025-01-24 DIAGNOSIS — I10 ESSENTIAL HYPERTENSION: ICD-10-CM

## 2025-01-24 DIAGNOSIS — L03.012 PARONYCHIA OF LEFT THUMB: Primary | ICD-10-CM

## 2025-01-24 PROCEDURE — 99999 PR PBB SHADOW E&M-EST. PATIENT-LVL IV: CPT | Mod: PBBFAC,,, | Performed by: PHYSICIAN ASSISTANT

## 2025-01-24 PROCEDURE — 3075F SYST BP GE 130 - 139MM HG: CPT | Mod: CPTII,S$GLB,, | Performed by: PHYSICIAN ASSISTANT

## 2025-01-24 PROCEDURE — 3008F BODY MASS INDEX DOCD: CPT | Mod: CPTII,S$GLB,, | Performed by: PHYSICIAN ASSISTANT

## 2025-01-24 PROCEDURE — 1160F RVW MEDS BY RX/DR IN RCRD: CPT | Mod: CPTII,S$GLB,, | Performed by: PHYSICIAN ASSISTANT

## 2025-01-24 PROCEDURE — 1159F MED LIST DOCD IN RCRD: CPT | Mod: CPTII,S$GLB,, | Performed by: PHYSICIAN ASSISTANT

## 2025-01-24 PROCEDURE — 99214 OFFICE O/P EST MOD 30 MIN: CPT | Mod: S$GLB,,, | Performed by: PHYSICIAN ASSISTANT

## 2025-01-24 PROCEDURE — G2211 COMPLEX E/M VISIT ADD ON: HCPCS | Mod: S$GLB,,, | Performed by: PHYSICIAN ASSISTANT

## 2025-01-24 PROCEDURE — 3079F DIAST BP 80-89 MM HG: CPT | Mod: CPTII,S$GLB,, | Performed by: PHYSICIAN ASSISTANT

## 2025-01-24 RX ORDER — MUPIROCIN 20 MG/G
OINTMENT TOPICAL 3 TIMES DAILY
Qty: 30 G | Refills: 0 | Status: SHIPPED | OUTPATIENT
Start: 2025-01-24 | End: 2025-02-03

## 2025-01-24 RX ORDER — CEPHALEXIN 500 MG/1
500 CAPSULE ORAL EVERY 8 HOURS
Qty: 21 CAPSULE | Refills: 0 | Status: SHIPPED | OUTPATIENT
Start: 2025-01-24 | End: 2025-01-31

## 2025-01-24 NOTE — PROGRESS NOTES
"Subjective:      Patient ID: Ruma Keane is a 39 y.o. female.    Chief Complaint: hangnail (She is here due to a hangnail that has possible gotten infected. States it started Monday and it has gotten worse. Started swelling Monday and is red and tender to touch. )    Patient is known to me, being seen today for infected hangnail.   She had hangnail on her L thumb that she pulled ~1wk ago and earlier this week noticed swelling, pain to area that is now extending into thumb   Symptoms are worsening   Treatment includes peroxide, salt water, triple antibiotic ointment without relief     Last visit Oct 2024 w Gerda Cristina NP.       Review of Systems   Constitutional:  Negative for activity change and unexpected weight change.   HENT:  Negative for hearing loss, rhinorrhea and trouble swallowing.    Eyes:  Negative for discharge and visual disturbance.   Respiratory:  Negative for chest tightness and wheezing.    Cardiovascular:  Negative for chest pain and palpitations.   Gastrointestinal:  Negative for blood in stool, constipation, diarrhea and vomiting.   Endocrine: Negative for polydipsia and polyuria.   Genitourinary:  Negative for difficulty urinating, dysuria, hematuria and menstrual problem.   Musculoskeletal:  Negative for arthralgias, joint swelling and neck pain.   Neurological:  Negative for weakness and headaches.   Psychiatric/Behavioral:  Negative for confusion and dysphoric mood.        Objective:   /88 (BP Location: Right arm, Patient Position: Sitting)   Pulse 85   Temp 97.7 °F (36.5 °C) (Tympanic)   Resp (!) 21   Ht 5' 5" (1.651 m)   Wt 91.9 kg (202 lb 9.6 oz)   LMP 01/10/2025   SpO2 98%   BMI 33.71 kg/m²   Physical Exam  Constitutional:       General: She is not in acute distress.     Appearance: She is well-developed. She is not ill-appearing or diaphoretic.   HENT:      Head: Normocephalic and atraumatic.      Right Ear: External ear normal.      Left Ear: External ear " normal.   Eyes:      General: Lids are normal.         Right eye: No discharge.         Left eye: No discharge.      Conjunctiva/sclera: Conjunctivae normal.      Right eye: Right conjunctiva is not injected.      Left eye: Left conjunctiva is not injected.   Pulmonary:      Effort: Pulmonary effort is normal. No respiratory distress.   Musculoskeletal:        Hands:    Skin:     General: Skin is warm and dry.      Findings: No rash.   Neurological:      Mental Status: She is alert and oriented to person, place, and time.   Psychiatric:         Speech: Speech normal.         Behavior: Behavior normal.         Thought Content: Thought content normal.         Judgment: Judgment normal.       Assessment:      1. Paronychia of left thumb    2. Essential hypertension       Plan:   Paronychia of left thumb  -     mupirocin (BACTROBAN) 2 % ointment; Apply topically 3 (three) times daily. for 10 days  Dispense: 30 g; Refill: 0  -     cephALEXin (KEFLEX) 500 MG capsule; Take 1 capsule (500 mg total) by mouth every 8 (eight) hours. for 7 days  Dispense: 21 capsule; Refill: 0    Essential hypertension   Controlled     Recommend warm soaks, elevation  Start oral and topical antibiotic   If no improvement in 48hrs, recommend hand specialist     Discussed worsening signs/symptoms and when to return to clinic or go to ED.   Patient expresses understanding and agrees with treatment plan.

## 2025-02-10 DIAGNOSIS — I10 ESSENTIAL HYPERTENSION: ICD-10-CM

## 2025-02-11 ENCOUNTER — PATIENT MESSAGE (OUTPATIENT)
Dept: INTERNAL MEDICINE | Facility: CLINIC | Age: 40
End: 2025-02-11
Payer: COMMERCIAL

## 2025-02-11 RX ORDER — AMLODIPINE BESYLATE 2.5 MG/1
2.5 TABLET ORAL DAILY
Qty: 90 TABLET | Refills: 1 | Status: SHIPPED | OUTPATIENT
Start: 2025-02-11

## 2025-02-11 NOTE — TELEPHONE ENCOUNTER
Care Due:                  Date            Visit Type   Department     Provider  --------------------------------------------------------------------------------                                MYCHART                              FOLLOWUP/OF  [facility]  Last Visit: 05-      [facility] VISIT   CHELSIE Cristina                              EP -                              PRIMARY      [facility]  Next Visit: 04-      CARE ([facility])   CHELSIE Cristina                                                            Last  Test          Frequency    Reason                     Performed    Due Date  --------------------------------------------------------------------------------    CMP.........  12 months..  hydroCHLOROthiazide......  04- 04-    [facility] Embedded Care Due Messages. Reference number: [id number].   2/11/2025 9:21:52 AM CST

## 2025-02-11 NOTE — TELEPHONE ENCOUNTER
Provider Staff:  Action required for this patient    Requires labs      Please see care gap opportunities below in Care Due Message.    Thanks!  [facility]     Appointments      Date Provider   Last Visit   5/24/2024 Sherrie Cristina DO   Next Visit   4/16/2025 Sherrie Cristina DO     Refill Decision Note   Ruma Keane  is requesting a refill authorization.  Brief Assessment and Rationale for Refill:  Approve     Medication Therapy Plan:         Comments:     Note composed:10:26 AM 02/11/2025             22

## 2025-03-28 ENCOUNTER — LAB VISIT (OUTPATIENT)
Dept: LAB | Facility: HOSPITAL | Age: 40
End: 2025-03-28
Attending: INTERNAL MEDICINE
Payer: COMMERCIAL

## 2025-03-28 ENCOUNTER — OFFICE VISIT (OUTPATIENT)
Dept: INTERNAL MEDICINE | Facility: CLINIC | Age: 40
End: 2025-03-28
Payer: COMMERCIAL

## 2025-03-28 VITALS
HEIGHT: 65 IN | SYSTOLIC BLOOD PRESSURE: 138 MMHG | RESPIRATION RATE: 20 BRPM | HEART RATE: 80 BPM | BODY MASS INDEX: 34.41 KG/M2 | WEIGHT: 206.56 LBS | DIASTOLIC BLOOD PRESSURE: 88 MMHG | OXYGEN SATURATION: 99 % | TEMPERATURE: 98 F

## 2025-03-28 DIAGNOSIS — R09.81 SINUS CONGESTION: Primary | ICD-10-CM

## 2025-03-28 DIAGNOSIS — R35.0 URINARY FREQUENCY: ICD-10-CM

## 2025-03-28 LAB
BACTERIA #/AREA URNS AUTO: ABNORMAL /HPF
BILIRUB UR QL STRIP.AUTO: NEGATIVE
CLARITY UR: ABNORMAL
COLOR UR AUTO: ABNORMAL
EAG (OHS): 108 MG/DL (ref 68–131)
GLUCOSE UR QL STRIP: NEGATIVE
HBA1C MFR BLD: 5.4 % (ref 4–5.6)
HGB UR QL STRIP: NEGATIVE
HYALINE CASTS UR QL AUTO: 0 /LPF (ref 0–1)
KETONES UR QL STRIP: NEGATIVE
LEUKOCYTE ESTERASE UR QL STRIP: ABNORMAL
MICROSCOPIC COMMENT: ABNORMAL
NITRITE UR QL STRIP: NEGATIVE
PH UR STRIP: 7 [PH]
PROT UR QL STRIP: ABNORMAL
RBC #/AREA URNS AUTO: 6 /HPF (ref 0–4)
SP GR UR STRIP: >=1.03
SQUAMOUS #/AREA URNS AUTO: 19 /HPF
UROBILINOGEN UR STRIP-ACNC: ABNORMAL EU/DL
WBC #/AREA URNS AUTO: 19 /HPF (ref 0–5)

## 2025-03-28 PROCEDURE — 81001 URINALYSIS AUTO W/SCOPE: CPT | Performed by: INTERNAL MEDICINE

## 2025-03-28 PROCEDURE — 3079F DIAST BP 80-89 MM HG: CPT | Mod: CPTII,S$GLB,, | Performed by: INTERNAL MEDICINE

## 2025-03-28 PROCEDURE — 1160F RVW MEDS BY RX/DR IN RCRD: CPT | Mod: CPTII,S$GLB,, | Performed by: INTERNAL MEDICINE

## 2025-03-28 PROCEDURE — 3075F SYST BP GE 130 - 139MM HG: CPT | Mod: CPTII,S$GLB,, | Performed by: INTERNAL MEDICINE

## 2025-03-28 PROCEDURE — 99999 PR PBB SHADOW E&M-EST. PATIENT-LVL IV: CPT | Mod: PBBFAC,,, | Performed by: INTERNAL MEDICINE

## 2025-03-28 PROCEDURE — G2211 COMPLEX E/M VISIT ADD ON: HCPCS | Mod: S$GLB,,, | Performed by: INTERNAL MEDICINE

## 2025-03-28 PROCEDURE — 99214 OFFICE O/P EST MOD 30 MIN: CPT | Mod: S$GLB,,, | Performed by: INTERNAL MEDICINE

## 2025-03-28 PROCEDURE — 3008F BODY MASS INDEX DOCD: CPT | Mod: CPTII,S$GLB,, | Performed by: INTERNAL MEDICINE

## 2025-03-28 PROCEDURE — 83036 HEMOGLOBIN GLYCOSYLATED A1C: CPT

## 2025-03-28 PROCEDURE — 1159F MED LIST DOCD IN RCRD: CPT | Mod: CPTII,S$GLB,, | Performed by: INTERNAL MEDICINE

## 2025-03-28 PROCEDURE — 87186 SC STD MICRODIL/AGAR DIL: CPT | Performed by: INTERNAL MEDICINE

## 2025-03-28 PROCEDURE — 36415 COLL VENOUS BLD VENIPUNCTURE: CPT

## 2025-03-28 PROCEDURE — 3044F HG A1C LEVEL LT 7.0%: CPT | Mod: CPTII,S$GLB,, | Performed by: INTERNAL MEDICINE

## 2025-03-28 RX ORDER — MONTELUKAST SODIUM 10 MG/1
10 TABLET ORAL NIGHTLY
Qty: 30 TABLET | Refills: 0 | Status: SHIPPED | OUTPATIENT
Start: 2025-03-28

## 2025-03-28 RX ORDER — FLUTICASONE PROPIONATE 50 MCG
2 SPRAY, SUSPENSION (ML) NASAL 2 TIMES DAILY
Qty: 16 G | Refills: 0 | Status: SHIPPED | OUTPATIENT
Start: 2025-03-28

## 2025-03-31 NOTE — PROGRESS NOTES
Ruma Keane  39 y.o. Black or  female  [id number]    Chief Complaint:  Chief Complaint   Patient presents with    Sinus Problem       History of Present Illness:  History of Present Illness    CHIEF COMPLAINT:  Ms. Keane presents today for nasal congestion and cold symptoms    HISTORY OF PRESENT ILLNESS:  She reports upper respiratory symptoms that started at the beginning of the week, including nasal congestion with green mucus noticed this morning. She experiences productive cough during sleep and reports puffy eyes. She denies sinus pain, headache, chest pain, fever, or chills. She recently finished using Flonase for nasal congestion.    GENITOURINARY:  She reports longstanding frequent urination with persistent urge to void shortly after urination. She experiences nocturia with multiple episodes per night. These symptoms persist even when not taking her diuretic medication. She denies dysuria.    MEDICAL HISTORY:  She had COVID-19 during the first week of February.    FAMILY HISTORY:  Her mother was diabetic and is .    CURRENT MEDICATIONS:  She reports being out of [employer]. She takes vitamin D3 and vitamin C supplements.         Review of Systems   Constitutional:  Negative for chills and fever.   HENT:  Positive for congestion. Negative for sinus pain.    Respiratory:  Positive for cough and sputum production. Negative for shortness of breath.    Cardiovascular:  Negative for chest pain.   Genitourinary:  Positive for frequency. Negative for dysuria.   Neurological:  Negative for headaches.   Endo/Heme/Allergies:  Positive for environmental allergies.       History:  Past Medical History:   Diagnosis Date    BRCA gene mutation negative     Helicobacter pylori (H. pylori) infection     Hepatitis C antibody positive in blood 2021    RNA NEGATIVE    Hepatitis C virus infection cured after antiviral drug therapy 2024-HCV RNA Not detected    Hypertension      LGSIL on Pap smear of cervix 03/03/2022    Plan- Colpo(3/22/22- LGSIL/BOY-I) // 8/31/22- Repeat pap Neg// 7/10/23- Pap//    Migraines        Medications:  Medications Ordered Prior to Encounter[1]    Allergies:  Review of patient's allergies indicates:   Allergen Reactions    Bactrim [sulfamethoxazole-trimethoprim] Swelling       Exam:  Vitals:    03/28/25 1119   BP: 138/88   Pulse: 80   Resp: 20   Temp: 97.5 °F (36.4 °C)     Weight: 93.7 kg (206 lb 9.1 oz)   Body mass index is 34.38 kg/m².      Physical Exam    Vitals: Reviewed.  Constitutional: No acute distress. Well-developed. Not ill-appearing.  Eyes: No scleral icterus.  Cardiovascular: Normal rate and regular rhythm. Normal heart sounds.  Pulmonary: Pulmonary effort is normal. No respiratory distress. Normal breath sounds.  Skin: Warm. Dry.  Neurological: Alert and oriented to person, place, and time.  Psychiatric: Behavior normal.         Assessment:  The primary encounter diagnosis was Sinus congestion. A diagnosis of Urinary frequency was also pertinent to this visit.    Assessment & Plan    ACUTE UPPER RESPIRATORY INFECTION / ALLERGIC RHINITIS:  - Evaluated patient's symptoms: rhinorrhea, green mucus when brushing teeth (starting Monday), nasal congestion, and cough.  - Ms. Keane denied sinus pain and headache.  - Assessed symptoms as likely related to allergies rather than bacterial infection, given improvement and only one instance of green mucus.  - Explained that a single occurrence of green mucus is insufficient to warrant antibiotic treatment.  - Advised patient to monitor symptoms, especially green mucus, over the weekend and contact the office on Monday if it persists.  - Recommend continuing Flonase nasal spray and Singulair for allergy management.  - Discussed cough medication for sleep, which the patient has already used.    FREQUENCY OF MICTURITION:  - Noted patient's report of frequent urination, including nocturia and frequent bathroom  stops when out.  - Ms. Keane denied dysuria.  - Evaluated frequency as a potential indicator of diabetes.  - Ordered urinalysis to check for urinary tract infection and labs to check for diabetes.    FAMILY HISTORY OF DIABETES MELLITUS:  - Acknowledged patient's family history of diabetes (mother was diabetic).  - Evaluated frequency of urination and thirst as potential indicators of diabetes.  - Ordered labs to check glucose levels.  - Discussed that OTC B12 supplements are available for energy, but are not typically prescribed unless levels are low.                        [1]   Current Outpatient Medications on File Prior to Visit   Medication Sig Dispense Refill    amLODIPine (NORVASC) 2.5 MG tablet Take 1 tablet (2.5 mg total) by mouth once daily. 90 tablet 1    DULCOLAX, BISACODYL, ORAL Take by mouth.      hydroCHLOROthiazide (HYDRODIURIL) 25 MG tablet Take 1 tablet (25 mg total) by mouth once daily. 90 tablet 1    levonorgestrel-ethinyl estradiol (VIENVA) 0.1-20 mg-mcg per tablet Take 1 tablet by mouth once daily. 28 tablet 11    traZODone (DESYREL) 50 MG tablet TAKE 1 TABLET(50 MG) BY MOUTH EVERY EVENING 30 tablet 1     No current facility-administered medications on file prior to visit.

## 2025-04-01 ENCOUNTER — RESULTS FOLLOW-UP (OUTPATIENT)
Dept: INTERNAL MEDICINE | Facility: CLINIC | Age: 40
End: 2025-04-01

## 2025-04-01 ENCOUNTER — TELEPHONE (OUTPATIENT)
Dept: INTERNAL MEDICINE | Facility: CLINIC | Age: 40
End: 2025-04-01
Payer: COMMERCIAL

## 2025-04-01 LAB
BACTERIA UR CULT: ABNORMAL
BACTERIA UR CULT: ABNORMAL

## 2025-04-01 RX ORDER — CIPROFLOXACIN 500 MG/1
500 TABLET ORAL 2 TIMES DAILY
Qty: 14 TABLET | Refills: 0 | Status: SHIPPED | OUTPATIENT
Start: 2025-04-01 | End: 2025-04-08

## 2025-05-21 ENCOUNTER — PATIENT MESSAGE (OUTPATIENT)
Dept: INTERNAL MEDICINE | Facility: CLINIC | Age: 40
End: 2025-05-21

## 2025-05-21 ENCOUNTER — OFFICE VISIT (OUTPATIENT)
Dept: INTERNAL MEDICINE | Facility: CLINIC | Age: 40
End: 2025-05-21
Payer: COMMERCIAL

## 2025-05-21 VITALS
SYSTOLIC BLOOD PRESSURE: 124 MMHG | TEMPERATURE: 98 F | DIASTOLIC BLOOD PRESSURE: 82 MMHG | OXYGEN SATURATION: 99 % | WEIGHT: 205.25 LBS | HEIGHT: 65 IN | BODY MASS INDEX: 34.2 KG/M2 | RESPIRATION RATE: 20 BRPM | HEART RATE: 82 BPM

## 2025-05-21 DIAGNOSIS — Z23 NEED FOR PNEUMOCOCCAL VACCINE: ICD-10-CM

## 2025-05-21 DIAGNOSIS — I10 ESSENTIAL HYPERTENSION: ICD-10-CM

## 2025-05-21 DIAGNOSIS — Z00.00 ANNUAL PHYSICAL EXAM: Primary | ICD-10-CM

## 2025-05-21 PROCEDURE — 3044F HG A1C LEVEL LT 7.0%: CPT | Mod: CPTII,S$GLB,, | Performed by: INTERNAL MEDICINE

## 2025-05-21 PROCEDURE — 3074F SYST BP LT 130 MM HG: CPT | Mod: CPTII,S$GLB,, | Performed by: INTERNAL MEDICINE

## 2025-05-21 PROCEDURE — 1160F RVW MEDS BY RX/DR IN RCRD: CPT | Mod: CPTII,S$GLB,, | Performed by: INTERNAL MEDICINE

## 2025-05-21 PROCEDURE — 1159F MED LIST DOCD IN RCRD: CPT | Mod: CPTII,S$GLB,, | Performed by: INTERNAL MEDICINE

## 2025-05-21 PROCEDURE — 90677 PCV20 VACCINE IM: CPT | Mod: S$GLB,,, | Performed by: INTERNAL MEDICINE

## 2025-05-21 PROCEDURE — 99999 PR PBB SHADOW E&M-EST. PATIENT-LVL IV: CPT | Mod: PBBFAC,,, | Performed by: INTERNAL MEDICINE

## 2025-05-21 PROCEDURE — 99395 PREV VISIT EST AGE 18-39: CPT | Mod: 25,S$GLB,, | Performed by: INTERNAL MEDICINE

## 2025-05-21 PROCEDURE — 3079F DIAST BP 80-89 MM HG: CPT | Mod: CPTII,S$GLB,, | Performed by: INTERNAL MEDICINE

## 2025-05-21 PROCEDURE — 3008F BODY MASS INDEX DOCD: CPT | Mod: CPTII,S$GLB,, | Performed by: INTERNAL MEDICINE

## 2025-05-21 PROCEDURE — 90471 IMMUNIZATION ADMIN: CPT | Mod: S$GLB,,, | Performed by: INTERNAL MEDICINE

## 2025-05-21 RX ORDER — AMLODIPINE BESYLATE 5 MG/1
5 TABLET ORAL DAILY
Qty: 90 TABLET | Refills: 1 | Status: SHIPPED | OUTPATIENT
Start: 2025-05-21

## 2025-05-21 NOTE — PROGRESS NOTES
Ruma Keane  39 y.o. Black or  female  [id number]    Chief Complaint:  Chief Complaint   Patient presents with    Annual Exam       History of Present Illness:  History of Present Illness    CHIEF COMPLAINT:  Ms. Keane presents today for an annual visit and to follow up after recent ER visit for high blood pressure    HYPERTENSION:  She visited the ER on the Friday before Mother's Day with blood pressure in the 190's systolic. She received clonidine in the ER which effectively lowered her blood pressure to 156 at discharge. She currently takes amlodipine 2.5mg twice daily and hydrochlorothiazide. She was previously on amlodipine 10mg but experienced feet swelling, leading to dosage reduction. She denies current swelling with the current regimen.    CURRENT SYMPTOMS:  She reports fatigue and feeling unwell on Sunday. She has a mild headache today attributed to not eating. She denies chest pain and trouble breathing.    GI:  She reports bowel movements occurring twice per week and denies black stools.    MEDICAL HISTORY:  She has a history of treated Hepatitis C infection, abnormal pap smear, and resolved urinary tract infection.    EXERCISE:  She recently started going to the gym but reports difficulty maintaining a consistent exercise schedule. She expresses intention to resume regular physical activity.         Review of Systems   Constitutional:  Positive for malaise/fatigue.   Respiratory:  Negative for cough and shortness of breath.    Cardiovascular:  Negative for chest pain and leg swelling.   Gastrointestinal:  Positive for constipation.   Genitourinary:  Negative for dysuria.   Neurological:  Positive for headaches.       Laboratory  Lab Results   Component Value Date    WBC 7.28 10/16/2023    HGB 12.3 10/16/2023    HCT 39.4 10/16/2023     10/16/2023    CHOL 173 04/16/2024    TRIG 51 04/16/2024    HDL 42 04/16/2024    ALT 20 04/16/2024    AST 14 04/16/2024     04/16/2024     K 4.3 04/16/2024     04/16/2024    CREATININE 0.8 04/16/2024    BUN 10 04/16/2024    CO2 25 04/16/2024    TSH 1.478 04/16/2024    INR 1.1 11/21/2019    HGBA1C 5.4 03/28/2025     Lab Results   Component Value Date    LDLCALC 120.8 04/16/2024       The following were reviewed: Active problem list, medication list, allergies, family history, social history, and Health Maintenance.     History:  Past Medical History:   Diagnosis Date    BRCA gene mutation negative     Helicobacter pylori (H. pylori) infection     Hepatitis C antibody positive in blood 01/06/2021    RNA NEGATIVE    Hepatitis C virus infection cured after antiviral drug therapy 01/2022 9/19/2024-HCV RNA Not detected    Hypertension     LGSIL on Pap smear of cervix 03/03/2022    Plan- Colpo(3/22/22- LGSIL/BOY-I) // 8/31/22- Repeat pap Neg// 7/10/23- Pap//    Migraines      Past Surgical History:   Procedure Laterality Date    CERVICAL BIOPSY  W/ LOOP ELECTRODE EXCISION  04/21/2022    LGSIL/BOY-I per Dr. Cowan    COLPOSCOPY  03/22/2022    LGSIL/BOY-I    DILATION AND CURETTAGE OF UTERUS      Endocervical Curettage  04/21/2022    Benign Tissue    TONSILLECTOMY       Family History   Problem Relation Name Age of Onset    Diabetes Mother Blossom Palacios     Kidney disease Mother Blossom Palacios     Hypertension Father Cisco Pate     Hypertension Sister       Social History[1]  Problem List[2]  Review of patient's allergies indicates:   Allergen Reactions    Bactrim [sulfamethoxazole-trimethoprim] Swelling       Health Maintenance  Health Maintenance Topics with due status: Not Due       Topic Last Completion Date    TETANUS VACCINE 10/25/2017    Cervical Cancer Screening 09/19/2024    Hemoglobin A1c (Diabetic Prevention Screening) 03/28/2025    RSV Vaccine (Age 60+ and Pregnant patients) Not Due     Health Maintenance Due   Topic Date Due    COVID-19 Vaccine (7 - 2024-25 season) 09/01/2024       Medications:  Medications Ordered Prior to  Encounter[3]    Medications have been reviewed and reconciled with patient at visit today.    Exam:  Vitals:    05/21/25 1455   BP: 124/82   Pulse: 82   Resp: 20   Temp: 97.8 °F (36.6 °C)     Weight: 93.1 kg (205 lb 4 oz)   Body mass index is 34.16 kg/m².      Physical Exam    Vitals: Reviewed. Blood pressure: 190/unknown. Hypertensive.  Constitutional: No acute distress. Well-developed. Not ill-appearing.  Eyes: No scleral icterus.  Neck: No cervical lymphadenopathy  Cardiovascular: Normal rate and regular rhythm. Normal heart sounds.  Pulmonary: Pulmonary effort is normal. No respiratory distress. Normal breath sounds.  Abdomen: Soft. Nontender. Nondistended. Normoactive bowel sounds.  Extremities: No edema.  Skin: Warm. Dry.  Neurological: Alert and oriented to person, place, and time.  Psychiatric: Behavior normal.         Assessment:  The primary encounter diagnosis was Annual physical exam. Diagnoses of Essential hypertension and Need for pneumococcal vaccine were also pertinent to this visit.    Assessment & Plan    ANNUAL PHYSICAL EXAM:  - Counseled regarding age appropriate screenings and immunizations  - Counseled regarding lifestyle modifications    HYPERTENSION:  - Monitored blood pressure with readings of 190/unknown at ER, 156/unknown at discharge, with normal readings during today's visit.  - Blood pressure is currently well-controlled with medication, though home readings differ from office measurements.  - Ms. Keane has been recording blood pressure at home but did not bring the monitoring device to the appointment.  - Increased amlodipine dose from 2.5 mg to 5 mg daily due to recent ER visit for hypertension, noting previous issues with higher doses causing edema.  - Continued hydrochlorothiazide at current dose.  - Discussed the accuracy of the patient's blood pressure machine and emphasized the importance of bringing it to the next appointment.  - Will consider adding another antihypertensive  medication if high readings persist and blood pressure cuff is accurate.  - Instructed patient to contact the office if experiencing persistent high blood pressure readings at home.  - Recommend reduced salt intake and continued weight loss efforts to potentially reduce need for BP medication.    FOLLOW-UP AND ADDITIONAL TESTS:  - Ordered cholesterol screening and other labs based on last check in April of previous year.  - Scheduled follow-up on Monday to check accuracy of home blood pressure machine and complete labs.                      [1]   Social History  Socioeconomic History    Marital status: Single   Tobacco Use    Smoking status: Never    Smokeless tobacco: Never   Substance and Sexual Activity    Alcohol use: No    Drug use: Never    Sexual activity: Yes     Partners: Male     Birth control/protection: Condom, OCP     Social Drivers of Health     Financial Resource Strain: Low Risk  (1/24/2025)    Overall Financial Resource Strain (CARDIA)     Difficulty of Paying Living Expenses: Not hard at all   Food Insecurity: No Food Insecurity (1/24/2025)    Hunger Vital Sign     Worried About Running Out of Food in the Last Year: Never true     Ran Out of Food in the Last Year: Never true   Transportation Needs: No Transportation Needs (12/26/2023)    PRAPARE - Transportation     Lack of Transportation (Medical): No     Lack of Transportation (Non-Medical): No   Physical Activity: Inactive (1/24/2025)    Exercise Vital Sign     Days of Exercise per Week: 0 days     Minutes of Exercise per Session: 0 min   Stress: No Stress Concern Present (1/24/2025)    [employer] - Occupational Stress Questionnaire     Feeling of Stress : Not at all   Housing Stability: Unknown (12/26/2023)    Housing Stability Vital Sign     Unable to Pay for Housing in the Last Year: No     Unstable Housing in the Last Year: No   [2]   Patient Active Problem List  Diagnosis    Essential hypertension    Obesity  (BMI 30.0-34.9)    Chronic bilateral low back pain without sciatica    Decreased ROM of lumbar spine    Insomnia   [3]   Current Outpatient Medications on File Prior to Visit   Medication Sig Dispense Refill    DULCOLAX, BISACODYL, ORAL Take by mouth.      fluticasone propionate (FLONASE) 50 mcg/actuation nasal spray 2 sprays (100 mcg total) by Each Nostril route 2 (two) times a day. 16 g 0    hydroCHLOROthiazide (HYDRODIURIL) 25 MG tablet Take 1 tablet (25 mg total) by mouth once daily. 90 tablet 1    levonorgestrel-ethinyl estradiol (VIENVA) 0.1-20 mg-mcg per tablet Take 1 tablet by mouth once daily. 28 tablet 11    montelukast (SINGULAIR) 10 mg tablet Take 1 tablet (10 mg total) by mouth every evening. 30 tablet 0    traZODone (DESYREL) 50 MG tablet TAKE 1 TABLET(50 MG) BY MOUTH EVERY EVENING 30 tablet 1    [DISCONTINUED] amLODIPine (NORVASC) 2.5 MG tablet TAKE 1 TABLET(2.5 MG) BY MOUTH DAILY 90 tablet 3     No current facility-administered medications on file prior to visit.

## 2025-06-16 ENCOUNTER — E-VISIT (OUTPATIENT)
Dept: INTERNAL MEDICINE | Facility: CLINIC | Age: 40
End: 2025-06-16
Payer: COMMERCIAL

## 2025-06-16 ENCOUNTER — LAB VISIT (OUTPATIENT)
Dept: LAB | Facility: HOSPITAL | Age: 40
End: 2025-06-16
Attending: INTERNAL MEDICINE
Payer: COMMERCIAL

## 2025-06-16 ENCOUNTER — PATIENT MESSAGE (OUTPATIENT)
Dept: INTERNAL MEDICINE | Facility: CLINIC | Age: 40
End: 2025-06-16

## 2025-06-16 DIAGNOSIS — N30.00 ACUTE CYSTITIS WITHOUT HEMATURIA: ICD-10-CM

## 2025-06-16 DIAGNOSIS — R35.0 URINARY FREQUENCY: ICD-10-CM

## 2025-06-16 DIAGNOSIS — R35.0 URINARY FREQUENCY: Primary | ICD-10-CM

## 2025-06-16 LAB
BACTERIA #/AREA URNS AUTO: ABNORMAL /HPF
BILIRUB UR QL STRIP.AUTO: NEGATIVE
CLARITY UR: ABNORMAL
COLOR UR AUTO: YELLOW
GLUCOSE UR QL STRIP: NEGATIVE
HGB UR QL STRIP: NEGATIVE
HYALINE CASTS UR QL AUTO: 4 /LPF (ref 0–1)
KETONES UR QL STRIP: NEGATIVE
LEUKOCYTE ESTERASE UR QL STRIP: ABNORMAL
MICROSCOPIC COMMENT: ABNORMAL
NITRITE UR QL STRIP: NEGATIVE
PH UR STRIP: 7 [PH]
PROT UR QL STRIP: ABNORMAL
RBC #/AREA URNS AUTO: 2 /HPF (ref 0–4)
SP GR UR STRIP: 1.03
SQUAMOUS #/AREA URNS AUTO: 19 /HPF
UROBILINOGEN UR STRIP-ACNC: NEGATIVE EU/DL
WBC #/AREA URNS AUTO: 8 /HPF (ref 0–5)

## 2025-06-16 PROCEDURE — 81003 URINALYSIS AUTO W/O SCOPE: CPT

## 2025-06-16 NOTE — PROGRESS NOTES
Patient ID: Ruma Keane is a 39 y.o. female.        E-Visit Time Tracking:   Day 1 Time (in minutes): 5  Day 2 Time (in minutes): 2  Total Time (in minutes): 7      Chief Complaint: General Illness (Entered automatically based on patient selection in [employer].)      The patient initiated a request through [employer] on 6/16/2025 for evaluation and management with a chief complaint of General Illness (Entered automatically based on patient selection in [employer].)     I evaluated the questionnaire submission on 06/18/2025.    [facility]-Common Problems    6/16/2025  1:10 AM CDT - Filed by Patient   What do you need help with? Urinary Symptoms   Do you agree to participate in an E-Visit? Yes   If you have any of the following symptoms, please go to the nearest emergency room or call 911: I acknowledge   Do you have any of the following pregnancy-related conditions? None   What is the main issue you would like addressed today? UTI symptoms feel like I have to urine frequently lower back and abdomen pains   Do you have any of the following symptoms? Passing urine more frequently;  Cloudy urine   When did your concern begin? 6/16/2025   What medications or treatments have you used to help your symptoms? None   What does your urine look like? Dark yellow   Have you had any of the following symptoms during the past 24 hours?   Urgency (a sudden and uncontrollable urge to urinate) Moderate   Frequency (going to the toilet very often) Severe   Burning pain when urinating None   Incomplete bladder emptying (still feel like you need to urinate again after urination) (None, Mild, Moderate, Severe) Severe   Pain in the lower belly when youre not urinating. Severe   Discomfort from your urinary symptoms. Severe   Have you had any of the following symptoms during the past 24 hours?   Blood seen in the urine None   Pain in the lower back on one or both sides (flank pain) Severe   Abnormal Vaginal Discharge  (abnormal amount, color and/or odor) Moderate   Discharge from the opening you urinate from (urethra) when not urinating. None   Have your symptoms interfered with your every day activities? Moderate   Do you have a fever? No   Are you a diabetic? No   Are you currently experiencing any of the following while completing this questionnaire? None   Do you have a history of kidney stones? No   Provide any additional information you feel is important.    Please attach any relevant images or files (if you have performed a home test for UTI, please submit a photo of results)    Are you able to take your vital signs? No         Encounter Diagnoses   Name Primary?    Urinary frequency Yes    Acute cystitis without hematuria         Orders Placed This Encounter   Procedures    Urinalysis     Standing Status:   Future     Number of Occurrences:   1     Expiration Date:   8/15/2026     Collection Type:   Urine, Clean Catch      Medications Ordered This Encounter   Medications    ciprofloxacin HCl (CIPRO) 500 MG tablet     Sig: Take 1 tablet (500 mg total) by mouth 2 (two) times daily. for 7 days     Dispense:  14 tablet     Refill:  0        No follow-ups on file.

## 2025-06-18 ENCOUNTER — RESULTS FOLLOW-UP (OUTPATIENT)
Dept: INTERNAL MEDICINE | Facility: CLINIC | Age: 40
End: 2025-06-18

## 2025-06-18 RX ORDER — CIPROFLOXACIN 500 MG/1
500 TABLET, FILM COATED ORAL 2 TIMES DAILY
Qty: 14 TABLET | Refills: 0 | Status: SHIPPED | OUTPATIENT
Start: 2025-06-18 | End: 2025-06-25

## 2025-08-20 DIAGNOSIS — I10 ESSENTIAL HYPERTENSION: ICD-10-CM
